# Patient Record
Sex: FEMALE | Race: WHITE | NOT HISPANIC OR LATINO | Employment: STUDENT | URBAN - METROPOLITAN AREA
[De-identification: names, ages, dates, MRNs, and addresses within clinical notes are randomized per-mention and may not be internally consistent; named-entity substitution may affect disease eponyms.]

---

## 2017-11-02 LAB
ABO + RH BLD: NORMAL
C TRACH RRNA SPEC QL PROBE: NEGATIVE
CYSTIC FIBROSIS MUTATION PNL: NEGATIVE
HEMOGLOBIN BANDS: NORMAL
HIV 1+2 AB+HIV1 P24 AG SERPL QL IA: NEGATIVE
INDIRECT COOMBS: NORMAL
N GONORRHOEAE, AMPLIFIED DNA: NEGATIVE
RUBELLA IMMUNE STATUS: NORMAL

## 2017-11-07 PROBLEM — O23.41 GBS (GROUP B STREPTOCOCCUS) UTI COMPLICATING PREGNANCY, FIRST TRIMESTER: Status: ACTIVE | Noted: 2017-11-07

## 2017-11-07 PROBLEM — B95.1 GBS (GROUP B STREPTOCOCCUS) UTI COMPLICATING PREGNANCY, FIRST TRIMESTER: Status: ACTIVE | Noted: 2017-11-07

## 2017-11-13 ENCOUNTER — TELEPHONE (OUTPATIENT)
Dept: OBSTETRICS AND GYNECOLOGY | Facility: CLINIC | Age: 22
End: 2017-11-13

## 2017-11-13 NOTE — TELEPHONE ENCOUNTER
----- Message from Britney Pena sent at 11/13/2017  1:52 PM CST -----  Contact: pt    Who Called: pt    Date of Positive Preg Test:n/a    1st day of Last Menstrual Cycle:08/22    List Any Difficulties: no    What Number to Call Back:[phone]    Pt should expect a return call by the end of the day.

## 2017-11-14 ENCOUNTER — TELEPHONE (OUTPATIENT)
Dept: OBSTETRICS AND GYNECOLOGY | Facility: CLINIC | Age: 22
End: 2017-11-14

## 2017-11-14 NOTE — TELEPHONE ENCOUNTER
----- Message from Angela Jose sent at 11/14/2017  4:36 PM CST -----  Contact: RON BRINK [[id number]]  _  1st Request  _  2nd Request  _  3rd Request        Who: RON BRINK [[id number]]    Why: Requesting a call back in regards to getting an appointment scheduled her pregnancy was confirmed with another provider. Please call her.     What Number to Call Back: [phone]    When to Expect a call back: (Within 24 hours)    Please return the call at earliest convenience. Thanks!

## 2017-12-06 ENCOUNTER — INITIAL PRENATAL (OUTPATIENT)
Dept: OBSTETRICS AND GYNECOLOGY | Facility: CLINIC | Age: 22
End: 2017-12-06
Payer: MEDICAID

## 2017-12-06 VITALS
BODY MASS INDEX: 23.72 KG/M2 | WEIGHT: 140.63 LBS | DIASTOLIC BLOOD PRESSURE: 60 MMHG | SYSTOLIC BLOOD PRESSURE: 104 MMHG

## 2017-12-06 DIAGNOSIS — Z34.02 ENCOUNTER FOR SUPERVISION OF NORMAL FIRST PREGNANCY IN SECOND TRIMESTER: ICD-10-CM

## 2017-12-06 PROBLEM — Z34.00 PREGNANCY, SUPERVISION, NORMAL, FIRST: Status: ACTIVE | Noted: 2017-12-06

## 2017-12-06 PROCEDURE — 99999 PR PBB SHADOW E&M-EST. PATIENT-LVL III: CPT | Mod: PBBFAC,,, | Performed by: OBSTETRICS & GYNECOLOGY

## 2017-12-06 PROCEDURE — 99202 OFFICE O/P NEW SF 15 MIN: CPT | Mod: TH,S$PBB,, | Performed by: OBSTETRICS & GYNECOLOGY

## 2017-12-06 PROCEDURE — 99213 OFFICE O/P EST LOW 20 MIN: CPT | Mod: PBBFAC,PO | Performed by: OBSTETRICS & GYNECOLOGY

## 2017-12-06 NOTE — PROGRESS NOTES
Complaints today: denies complaints    /60   Wt 63.8 kg (140 lb 10.5 oz)   LMP 2017 (Approximate)   BMI 23.72 kg/m²     22 y.o., at 13w4d by Estimated Date of Delivery: 18  Patient Active Problem List   Diagnosis    GBS (group b Streptococcus) UTI complicating pregnancy, first trimester    Pregnancy, supervision, normal, first     OB History    Para Term  AB Living   1             SAB TAB Ectopic Multiple Live Births                  # Outcome Date GA Lbr Zac/2nd Weight Sex Delivery Anes PTL Lv   1 Current                   Dating reviewed    Allergies and problem list reviewed and updated    Medical and surgical history reviewed    Prenatal labs reviewed and updated    Physical Exam:  ABD: soft, gravid, nontender,     Assessment:  Amanda was seen today for routine prenatal visit, morning sickness, vaginal discharge, vaginal itching and swelling.    Diagnoses and all orders for this visit:    Encounter for supervision of normal first pregnancy in second trimester  -     US MFM Procedure ([facility]); Future         Plan:      follow up 4 Weeks, kick counts, labor precautions

## 2017-12-17 ENCOUNTER — PATIENT MESSAGE (OUTPATIENT)
Dept: OBSTETRICS AND GYNECOLOGY | Facility: CLINIC | Age: 22
End: 2017-12-17

## 2017-12-23 ENCOUNTER — PATIENT MESSAGE (OUTPATIENT)
Dept: OBSTETRICS AND GYNECOLOGY | Facility: CLINIC | Age: 22
End: 2017-12-23

## 2017-12-26 ENCOUNTER — TELEPHONE (OUTPATIENT)
Dept: OBSTETRICS AND GYNECOLOGY | Facility: CLINIC | Age: 22
End: 2017-12-26

## 2017-12-26 NOTE — TELEPHONE ENCOUNTER
Called and phone number disconnected. Was attempting to call pt back about her symptoms. Will contact through Hubs1 message.

## 2018-01-03 ENCOUNTER — ROUTINE PRENATAL (OUTPATIENT)
Dept: OBSTETRICS AND GYNECOLOGY | Facility: CLINIC | Age: 23
End: 2018-01-03
Payer: MEDICAID

## 2018-01-03 VITALS
WEIGHT: 144.63 LBS | DIASTOLIC BLOOD PRESSURE: 60 MMHG | BODY MASS INDEX: 24.39 KG/M2 | SYSTOLIC BLOOD PRESSURE: 100 MMHG

## 2018-01-03 DIAGNOSIS — R10.2 PELVIC PAIN AFFECTING PREGNANCY IN SECOND TRIMESTER, ANTEPARTUM: ICD-10-CM

## 2018-01-03 DIAGNOSIS — O26.892 PELVIC PAIN AFFECTING PREGNANCY IN SECOND TRIMESTER, ANTEPARTUM: ICD-10-CM

## 2018-01-03 DIAGNOSIS — O23.41 GBS (GROUP B STREPTOCOCCUS) UTI COMPLICATING PREGNANCY, FIRST TRIMESTER: ICD-10-CM

## 2018-01-03 DIAGNOSIS — B95.1 GBS (GROUP B STREPTOCOCCUS) UTI COMPLICATING PREGNANCY, FIRST TRIMESTER: ICD-10-CM

## 2018-01-03 DIAGNOSIS — N89.8 VAGINAL DISCHARGE: ICD-10-CM

## 2018-01-03 DIAGNOSIS — B37.9 YEAST INFECTION: ICD-10-CM

## 2018-01-03 DIAGNOSIS — Z34.02 ENCOUNTER FOR SUPERVISION OF NORMAL FIRST PREGNANCY IN SECOND TRIMESTER: Primary | ICD-10-CM

## 2018-01-03 LAB
CANDIDA RRNA VAG QL PROBE: NEGATIVE
G VAGINALIS RRNA GENITAL QL PROBE: NEGATIVE
T VAGINALIS RRNA GENITAL QL PROBE: NEGATIVE

## 2018-01-03 PROCEDURE — 87491 CHLMYD TRACH DNA AMP PROBE: CPT

## 2018-01-03 PROCEDURE — 99999 PR PBB SHADOW E&M-EST. PATIENT-LVL III: CPT | Mod: PBBFAC,,, | Performed by: OBSTETRICS & GYNECOLOGY

## 2018-01-03 PROCEDURE — 99213 OFFICE O/P EST LOW 20 MIN: CPT | Mod: PBBFAC,PO | Performed by: OBSTETRICS & GYNECOLOGY

## 2018-01-03 PROCEDURE — 87480 CANDIDA DNA DIR PROBE: CPT

## 2018-01-03 PROCEDURE — 87086 URINE CULTURE/COLONY COUNT: CPT

## 2018-01-03 PROCEDURE — 99213 OFFICE O/P EST LOW 20 MIN: CPT | Mod: TH,S$PBB,, | Performed by: OBSTETRICS & GYNECOLOGY

## 2018-01-03 RX ORDER — FLUCONAZOLE 150 MG/1
150 TABLET ORAL ONCE
Qty: 1 TABLET | Refills: 1 | Status: SHIPPED | OUTPATIENT
Start: 2018-01-03 | End: 2018-01-03

## 2018-01-03 NOTE — PROGRESS NOTES
"Chief Complaint   Patient presents with    Routine Prenatal Visit       22 y.o., at 17w4d by Estimated Date of Delivery: 18    Complaints today: Reports painful, irritation, and itching in the vagina with increased "cottage cheese" vaginal discharge. She also reports right sided tightness/pain in the pelvis, worsened with sports activity. Also reports dizziness and feeling of almost passing out. Has not lost consciousness. No h/o cardiac issues, only started with second trimester. Advised increased water intake, compression stockings. Will do cardiac w.o if it does not resolve. Also c/o low back pain - lifestyle modification given.    ROS  OBSTETRICS: No contractions, bleeding, or loss of fluid.  +fetal movement.  GASTRO: No nausea or vomiting.  EXTREMITIES: No edema      OB History    Para Term  AB Living   1             SAB TAB Ectopic Multiple Live Births                  # Outcome Date GA Lbr Zac/2nd Weight Sex Delivery Anes PTL Lv   1 Current                   Dating reviewed  Allergies and problem list reviewed and updated  Medical and surgical history reviewed  Prenatal labs reviewed and updated    PHYSICAL EXAM  /60   Wt 65.6 kg (144 lb 10 oz)   LMP 2017 (Approximate)   BMI 24.39 kg/m²     GENERAL: No acute distress  NEURO: Alert and oriented x3  PSYCH: Normal mood and affect  PULMONARY: Non-labored respiration  ABDomen: Soft, gravid, nontender  PELVIC: erythema, thick yellow-white "cottage cheese" type discharge, mixed with watery-thin discharge, generalized tenderness on exam, cervix closed    ASSESSMENT AND PLAN     Pregnancy Problems (from 17 to present)     Problem Noted Resolved    Pregnancy, supervision, normal, first 2017 by Radha Saleh MD No    GBS (group b Streptococcus) UTI complicating pregnancy, first trimester 2017 by Irina Amaro MD No          Will test urine culture  Will treat yeast, +hyphae on wet prep, fern " negative     labor precautions given  Follow-up: 4 weeks

## 2018-01-04 LAB
C TRACH DNA SPEC QL NAA+PROBE: NOT DETECTED
N GONORRHOEA DNA SPEC QL NAA+PROBE: NOT DETECTED

## 2018-01-05 LAB
BACTERIA UR CULT: NORMAL
BACTERIA UR CULT: NORMAL

## 2018-01-10 ENCOUNTER — TELEPHONE (OUTPATIENT)
Dept: OBSTETRICS AND GYNECOLOGY | Facility: CLINIC | Age: 23
End: 2018-01-10

## 2018-01-10 RX ORDER — HYDROCORTISONE 25 MG/G
CREAM TOPICAL 2 TIMES DAILY
Qty: 3.5 G | Refills: 3 | Status: SHIPPED | OUTPATIENT
Start: 2018-01-10 | End: 2018-03-08

## 2018-01-10 NOTE — TELEPHONE ENCOUNTER
----- Message from Walter Hendricks MA sent at 1/10/2018 11:07 AM CST -----  Contact: patient      ----- Message -----  From: Adin Mack  Sent: 1/10/2018  10:19 AM  To: Delfino PATEL Staff    x_ 1st Request   _ 2nd Request   _ 3rd Request     Who: RON BRINK [[id number]]    Why: patient called stated she was told at appointment that a cream would be prescribed for her and sent to [facility] [address] [place] [address] [place] AT [facility] [phone].  Please call the patient in reference to the prescription.    What Number to Call Back: [phone]    When to Expect a call back: (Before the end of the day)   -- if call after 3:00 call back will be tomorrow.

## 2018-01-11 ENCOUNTER — ROUTINE PRENATAL (OUTPATIENT)
Dept: OBSTETRICS AND GYNECOLOGY | Facility: CLINIC | Age: 23
End: 2018-01-11
Payer: MEDICAID

## 2018-01-11 VITALS — WEIGHT: 145 LBS | BODY MASS INDEX: 24.45 KG/M2 | SYSTOLIC BLOOD PRESSURE: 102 MMHG | DIASTOLIC BLOOD PRESSURE: 68 MMHG

## 2018-01-11 DIAGNOSIS — O99.891 ZIKA VIRUS EXPOSURE AFFECTING PREGNANCY: ICD-10-CM

## 2018-01-11 DIAGNOSIS — Z34.02 ENCOUNTER FOR SUPERVISION OF NORMAL FIRST PREGNANCY IN SECOND TRIMESTER: Primary | ICD-10-CM

## 2018-01-11 DIAGNOSIS — Z20.821 ZIKA VIRUS EXPOSURE AFFECTING PREGNANCY: ICD-10-CM

## 2018-01-11 PROCEDURE — 99213 OFFICE O/P EST LOW 20 MIN: CPT | Mod: TH,S$PBB,, | Performed by: OBSTETRICS & GYNECOLOGY

## 2018-01-11 PROCEDURE — 99999 PR PBB SHADOW E&M-EST. PATIENT-LVL I: CPT | Mod: PBBFAC,,, | Performed by: OBSTETRICS & GYNECOLOGY

## 2018-01-11 PROCEDURE — 99211 OFF/OP EST MAY X REQ PHY/QHP: CPT | Mod: PBBFAC,PO | Performed by: OBSTETRICS & GYNECOLOGY

## 2018-01-11 NOTE — PROGRESS NOTES
Complaints today: none  Good fm.  Denies ctx, vb, lof.  Centering in pregnancy session 1.  Discussed personal goals, prenatal testing, nutrition, healthy choices.  She was in [place] 3 weeks agio    /68   Wt 65.8 kg (145 lb)   LMP 2017 (Approximate)   BMI 24.45 kg/m²     22 y.o., at 18w5d by Estimated Date of Delivery: 18  Patient Active Problem List   Diagnosis    GBS (group b Streptococcus) UTI complicating pregnancy, first trimester    Pregnancy, supervision, normal, first    Zika virus exposure affecting pregnancy     OB History    Para Term  AB Living   1             SAB TAB Ectopic Multiple Live Births                  # Outcome Date GA Lbr Zac/2nd Weight Sex Delivery Anes PTL Lv   1 Current                   Dating reviewed    Allergies and problem list reviewed and updated    Medical and surgical history reviewed    Prenatal labs reviewed and updated    Physical Exam:  ABD: soft, gravid, nontender,     Assessment:  Diagnoses and all orders for this visit:    Encounter for supervision of normal first pregnancy in second trimester    Zika virus exposure affecting pregnancy  -     Zika Virus IgM; Future         Plan:   follow up labs   follow up 4 Weeks, bleeding/pain  kick counts, labor precautions

## 2018-01-16 ENCOUNTER — PATIENT MESSAGE (OUTPATIENT)
Dept: OBSTETRICS AND GYNECOLOGY | Facility: CLINIC | Age: 23
End: 2018-01-16

## 2018-01-16 ENCOUNTER — OFFICE VISIT (OUTPATIENT)
Dept: MATERNAL FETAL MEDICINE | Facility: CLINIC | Age: 23
End: 2018-01-16
Attending: OBSTETRICS & GYNECOLOGY
Payer: MEDICAID

## 2018-01-16 DIAGNOSIS — Z36.89 ENCOUNTER FOR FETAL ANATOMIC SURVEY: ICD-10-CM

## 2018-01-16 DIAGNOSIS — Z34.02 ENCOUNTER FOR SUPERVISION OF NORMAL FIRST PREGNANCY IN SECOND TRIMESTER: ICD-10-CM

## 2018-01-16 PROCEDURE — 76805 OB US >/= 14 WKS SNGL FETUS: CPT | Mod: PBBFAC | Performed by: OBSTETRICS & GYNECOLOGY

## 2018-01-16 PROCEDURE — 76805 OB US >/= 14 WKS SNGL FETUS: CPT | Mod: 26,S$PBB,, | Performed by: OBSTETRICS & GYNECOLOGY

## 2018-01-16 PROCEDURE — 99499 UNLISTED E&M SERVICE: CPT | Mod: S$PBB,,, | Performed by: OBSTETRICS & GYNECOLOGY

## 2018-01-16 NOTE — PROGRESS NOTES
"OB Ultrasound Report (see PDF version under imaging tab)    Indication  ========    Fetal anatomy survey.    Method  ======    Transabdominal ultrasound examination.    Pregnancy  =========    Benedr pregnancy. Number of fetuses: 1.    Dating  ======    Cycle: regular cycle  GA by "stated dating" 19 w + 3 d  SHIRA by "stated dating": 6/9/2018  Ultrasound examination on: 1/16/2018  GA by U/S based upon: AC, BPD, Femur, HC  GA by U/S 19 w + 5 d  SHIRA by U/S: 6/7/2018  Assigned: Dating performed on 01/16/2018, based on the external assessment  Assigned GA 19 w + 3 d  Assigned SHIRA: 6/9/2018    General Evaluation  ===============    Cardiac activity: present.  bpm.  Fetal movements: visualized.  Presentation: breech.  Placenta: anterior.  Umbilical cord: normal, 3 vessel cord.  Amniotic fluid: normal amount.    Fetal Biometry  ===========    Fetal Biometry  BPD 45.0 mm 19w 4d Hadlock  OFD 61.9 mm 21w 2d Kayleen  .5 mm 19w 5d Hadlock  .6 mm 20w 0d Hadlock  Femur 30.4 mm 19w 3d Hadlock  Cerebellum tr 20.1 mm 20w 0d Burris  CM 4.6 mm  Nuchal fold 4.29 mm  Humerus 30.8 mm 20w 1d Kayleen   g  Calculated by: Hadlock (BPD-HC-AC-FL)  EFW (lb) 0 lb  EFW (oz) 11 oz  Cephalic index 0.73  HC / AC 1.17  FL / BPD 0.68  FL / AC 0.21   bpm  Head / Face / Neck   4.5 mm    Fetal Anatomy  ===========    Cranium: normal  Lateral ventricles: normal  Choroid plexus: normal  Midline falx: normal  Cavum septi pellucidi: normal  Cerebellum: normal  Cisterna magna: normal  Rt lateral ventricle: normal  Lt lateral ventricle: normal  Rt choroid plexus: normal  Lt choroid plexus: normal  Lips: normal  Profile: normal  Nose: normal  4-chamber view: normal  RVOT: normal  LVOT: normal  Situs: normal  Cardiac position: normal  Cardiac axis: normal  Cardiac size: normal  Cardiac rhythm: normal  Rt lung: normal  Lt lung: normal  Diaphragm: normal  Cord " insertion: normal  Stomach: normal  Kidneys: normal  Bladder: normal  Abdom. wall: appears normal  Abdom. cavity: normal  Rt kidney: normal  Lt kidney: normal  Cervical spine: normal  Thoracic spine: normal  Lumbar spine: normal  Sacral spine: normal  Arms: normal  Legs: normal  Rt arm: normal  Lt arm: normal  Rt hand: present  Lt hand: present  Rt leg: normal  Lt leg: normal  Rt foot: normal  Lt foot: normal  Gender: male  Wants to know gender: yes    Maternal Structures  ===============    Uterus / Cervix  Uterus: Normal  Cervical length 47.7 mm  Ovaries / Tubes / Adnexa  Rt ovary: Visualized  Lt ovary: Visualized    Impression  =========    A aquino living IUP is identified. Fetal size is appropriate for established dating. No fetal structural malformations are identified.  Cervical length is normal, and placental location is normal without evidence of previa. Follow-up ultrasound as clinically indicated.

## 2018-01-26 ENCOUNTER — PATIENT MESSAGE (OUTPATIENT)
Dept: OBSTETRICS AND GYNECOLOGY | Facility: CLINIC | Age: 23
End: 2018-01-26

## 2018-02-14 ENCOUNTER — ROUTINE PRENATAL (OUTPATIENT)
Dept: OBSTETRICS AND GYNECOLOGY | Facility: CLINIC | Age: 23
End: 2018-02-14
Payer: MEDICAID

## 2018-02-14 VITALS
BODY MASS INDEX: 25.76 KG/M2 | DIASTOLIC BLOOD PRESSURE: 70 MMHG | WEIGHT: 152.75 LBS | SYSTOLIC BLOOD PRESSURE: 108 MMHG

## 2018-02-14 DIAGNOSIS — Z34.03 ENCOUNTER FOR SUPERVISION OF NORMAL FIRST PREGNANCY IN THIRD TRIMESTER: Primary | ICD-10-CM

## 2018-02-14 PROCEDURE — 99212 OFFICE O/P EST SF 10 MIN: CPT | Mod: PBBFAC,TH,PO | Performed by: OBSTETRICS & GYNECOLOGY

## 2018-02-14 PROCEDURE — 99213 OFFICE O/P EST LOW 20 MIN: CPT | Mod: TH,S$PBB,, | Performed by: OBSTETRICS & GYNECOLOGY

## 2018-02-14 PROCEDURE — 99999 PR PBB SHADOW E&M-EST. PATIENT-LVL II: CPT | Mod: PBBFAC,,, | Performed by: OBSTETRICS & GYNECOLOGY

## 2018-02-14 PROCEDURE — 3008F BODY MASS INDEX DOCD: CPT | Mod: ,,, | Performed by: OBSTETRICS & GYNECOLOGY

## 2018-02-14 NOTE — PROGRESS NOTES
Chief Complaint   Patient presents with    Routine Prenatal Visit       22 y.o., at 23w4d by Estimated Date of Delivery: 18    Complaints today: none. Pt has a lot of questions about vaccines for her and baby. She also would like a tub birth and is interested in transferring her care to midwives if possible.    ROS  OBSTETRICS:   Contractions none   Bleeding none   Loss of fluid none   Fetal mvmnt yes  GASTRO:   Nausea none   Vomiting none      OB History    Para Term  AB Living   1             SAB TAB Ectopic Multiple Live Births                  # Outcome Date GA Lbr Zac/2nd Weight Sex Delivery Anes PTL Lv   1 Current                   Dating reviewed  Allergies and problem list reviewed and updated  Medical and surgical history reviewed  Prenatal labs reviewed and updated    PHYSICAL EXAM  /70   Wt 69.3 kg (152 lb 12.5 oz)   LMP 2017 (Approximate)   BMI 25.76 kg/m²     GENERAL: No acute distress  NEURO: Alert and oriented x3  PSYCH: Normal mood and affect  PULMONARY: Non-labored respiration  ABDomen: Soft, gravid, nontender    ASSESSMENT AND PLAN     Pregnancy Problems (from 17 to present)     Problem Noted Resolved    Pregnancy, supervision, normal, first 2017 by Radha Saleh MD No    GBS (group b Streptococcus) UTI complicating pregnancy, first trimester 2017 by Irina Amaro MD No          Counseled on recommended vaccines, flu (now) and Tdap (future) - pt declines flu at this time. Discussed why vaccines are recommended.  Pt desires to have a natural tub birth and her mother is midwife in [place]. Discussed that can labor in tub but if obstetrician is birth attendant, will not allow tub birth. However, patient can transfer care to [facility] for the possibility of tub birth in [place].  Needs 1hr GCT/CBC at next visit     labor precautions given  Follow-up: 4 weeks with CNM group

## 2018-03-08 ENCOUNTER — INITIAL PRENATAL (OUTPATIENT)
Dept: OBSTETRICS AND GYNECOLOGY | Facility: CLINIC | Age: 23
End: 2018-03-08
Payer: MEDICAID

## 2018-03-08 VITALS
BODY MASS INDEX: 26.34 KG/M2 | SYSTOLIC BLOOD PRESSURE: 112 MMHG | WEIGHT: 156.19 LBS | DIASTOLIC BLOOD PRESSURE: 76 MMHG

## 2018-03-08 DIAGNOSIS — Z34.92 PRENATAL CARE IN SECOND TRIMESTER: Primary | ICD-10-CM

## 2018-03-08 PROCEDURE — 99212 OFFICE O/P EST SF 10 MIN: CPT | Mod: PBBFAC | Performed by: ADVANCED PRACTICE MIDWIFE

## 2018-03-08 PROCEDURE — 99212 OFFICE O/P EST SF 10 MIN: CPT | Mod: TH,S$PBB,, | Performed by: ADVANCED PRACTICE MIDWIFE

## 2018-03-08 PROCEDURE — 99999 PR PBB SHADOW E&M-EST. PATIENT-LVL II: CPT | Mod: PBBFAC,,, | Performed by: ADVANCED PRACTICE MIDWIFE

## 2018-03-08 RX ORDER — LANOLIN ALCOHOL/MO/W.PET/CERES
400 CREAM (GRAM) TOPICAL DAILY
COMMUNITY
End: 2018-07-26

## 2018-03-09 NOTE — PROGRESS NOTES
Amanda Cazares is a 22 y.o. , presents today for a transfer of prenatal care within the [facility]    C/C: transfer of prenatal care    Dating via 9wk US    Reports normal + FM, denies VB, LOF or cramping.     SOCIAL HISTORY: Denies emotional/mental/physical/sexual violence or abuse. Feels safe at home. Unaccompanied today. In relationship with SLAVA, Tee, first baby for both. 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 They live in [place] .    PAP HISTORY: last pap , result negative, denies any history of abnormal pap smear or STDs.     Reports no long-term chronic medical conditions     Review of patient's allergies indicates:  No Known Allergies  No past medical history on file.  Past Surgical History:   Procedure Laterality Date    NOSE SURGERY       Past Surgical History:   Procedure Laterality Date    NOSE SURGERY       OB History    Para Term  AB Living   1             SAB TAB Ectopic Multiple Live Births                  # Outcome Date GA Lbr Zac/2nd Weight Sex Delivery Anes PTL Lv   1 Current                 OB History      Para Term  AB Living    1              SAB TAB Ectopic Multiple Live Births                     Social History     Social History    Marital status: Single     Spouse name: N/A    Number of children: N/A    Years of education: N/A     Occupational History    Not on file.     Social History Main Topics    Smoking status: Never Smoker    Smokeless tobacco: Never Used    Alcohol use No    Drug use: No    Sexual activity: Yes     Partners: Male     Birth control/ protection: None     Other Topics Concern    Not on file     Social History Narrative    Partner Tee    First baby for both!    Live in [place]    She is [employer], he has palma shop     Family History   Problem Relation Age of Onset    Breast cancer Maternal Grandmother      Dx later in life    Miscarriages / Stillbirths Maternal Grandmother     Colon cancer Neg Hx      labor  Neg Hx     Ovarian cancer Neg Hx      History   Sexual Activity    Sexual activity: Yes    Partners: Male    Birth control/ protection: None     OBJECTIVE: see prenatal flow sheet  /76   Wt 70.8 kg (156 lb 3.1 oz)   LMP 2017 (Approximate)   BMI 26.34 kg/m²   Constitutional/Gen: NAD, appears stated age, well groomed  Lung: normal resp effort  Extremities: FROM, with no edema or tenderness.  Neurologic: A&O x 4, non-focal, cranial nerves 2-12 grossly intact  Psych: affect appropriate and without signs of mood, thought or memory difficulty appreciated    ASSESSMENT:  22 y.o. female  at 26w5d for transfer of prenatal care  Body mass index is 26.34 kg/m².  Patient Active Problem List   Diagnosis    GBS (group b Streptococcus) UTI complicating pregnancy, first trimester    Pregnancy, supervision, normal, first    Zika virus exposure affecting pregnancy       PLAN:  1. Transfer of prenatal care within [facility]  -- Continue prenatal care  -- Upcoming 28wk labs reviewed and ordered. Rh POS.     2. BMI 22  -- Discussed IOM recommended weight gain of:   Weight category Pre pre BMI  Recommended TWG   Underweight Less than 18.5 28-40    Normal Weight 18.5-24.9  25-35    Overweight 25-29.9  15-25    Obese   30 and greater  11-20   -- Discussed criteria for delivery at [facility] r/t excessive pre-preg weight or excessive weight gain:   Pre-pregnancy BMI over 40 or excess pregnancy weight gain defined as:   Pre-preg BMI < 18.5; Excess weight gain = > 60 pound   Pre-preg BMI 18.5-24.9;  Excess weight gain = > 53 pounds   Pre-preg BMI 25-29.9;  Excess weight gain = > 38 pounds   Pre-preg BMI > 30;  Excess weight gain = > 30 pounds    3. Oriented to practice and midwifery service  -- Pregnancy education and couseling; handouts and booklet provided  -- Reviewed anticipated prenatal course of care and how to contact us  -- Reviewed [facility] guidelines and admission, exclusion, and transfer criteria  --  Precautions/warning signs reviewed  -- Common complaints of pregnancy  -- Routine prenatal labs including HIV  -- Ultrasounds  -- Childbirth education/hospital/ABC facilities  -- Nutrition, prepregnant BMI, and recommended weight gain  -- Toxoplasmosis precautions (Cats/Raw Meat)  -- Sexual activity and exercise  -- Environmental/Work hazards  -- Travel  -- Tobacco (Ask, Advise, Assess, Assist, and Arrange), as well as alcohol and drug use  -- Use of any medications (Including supplements, Vitamins, Herbs, or OTC Drugs)  -- Domestic violence screen negative    4. GBS uria  -- Reviewed, will need intrapartum abx    5. Possible zika exposure  -- Travelled to [place]; labs were already ordered; she plans to obtain    6. Reviewed warning signs, precautions, and how/when to contact us.     7. RTC x 2 weeks, call or present sooner prn.     Updated Medication List:  Current Outpatient Prescriptions   Medication Sig Dispense Refill    magnesium oxide (MAG-OX) 400 mg tablet Take 400 mg by mouth once daily.      omega-3 fatty acids fish oil 1,050-1,200 mg Cap capsule Take 1 capsule by mouth once daily.      PRENATAL VIT,CALC76/IRON/FOLIC (PNV 29-1 ORAL) Take 1 tablet by mouth once daily.       No current facility-administered medications for this visit.          Allison Orellana CNM/NP  3/8/2018 8:50 PM

## 2018-03-20 ENCOUNTER — PATIENT MESSAGE (OUTPATIENT)
Dept: OBSTETRICS AND GYNECOLOGY | Facility: CLINIC | Age: 23
End: 2018-03-20

## 2018-03-21 NOTE — TELEPHONE ENCOUNTER
Called pt and discussed - describes small area just beside/above umbilicus that is painful at times. Occurs intermittently and seems to occur at least once daily. Denies VB, no LOF. Reports good FM.  Denies upper abdominal pain/pain just below right breast. No headache (although they have occured intermittently in her pregnancy) and no visual disturbances.  Discussed possibility of umbilical hernia, but unable to diagnose over the phone.  Education re: warning s/s of PTL and PreE to come in with.  Discussed if hernia, warning signs such as skin changes, worsening/increasing or severe pain, etc which would warrant urgent medical evaluation.    In addition, pt reports suspects she stepped wrong this morning and having some groin discomfort with certain walking movements now this evening. Advised Tylenol, ice, and/or warm bath and rest. Discussed warning s/s to come in for evaluation re:. Gave after hours contact info.

## 2018-03-22 ENCOUNTER — HOSPITAL ENCOUNTER (EMERGENCY)
Facility: OTHER | Age: 23
Discharge: HOME | End: 2018-03-22
Attending: OBSTETRICS & GYNECOLOGY
Payer: MEDICAID

## 2018-03-22 VITALS
RESPIRATION RATE: 18 BRPM | DIASTOLIC BLOOD PRESSURE: 77 MMHG | OXYGEN SATURATION: 98 % | TEMPERATURE: 98 F | HEART RATE: 73 BPM | SYSTOLIC BLOOD PRESSURE: 117 MMHG

## 2018-03-22 DIAGNOSIS — B37.31 YEAST VAGINITIS: ICD-10-CM

## 2018-03-22 DIAGNOSIS — R10.2 PELVIC PAIN AFFECTING PREGNANCY IN THIRD TRIMESTER, ANTEPARTUM: Primary | ICD-10-CM

## 2018-03-22 DIAGNOSIS — Z3A.28 28 WEEKS GESTATION OF PREGNANCY: ICD-10-CM

## 2018-03-22 DIAGNOSIS — O26.893 PELVIC PAIN AFFECTING PREGNANCY IN THIRD TRIMESTER, ANTEPARTUM: Primary | ICD-10-CM

## 2018-03-22 LAB
BACTERIA #/AREA URNS HPF: NORMAL /HPF
BILIRUB UR QL STRIP: NEGATIVE
CLARITY UR: CLEAR
COLOR UR: YELLOW
GLUCOSE UR QL STRIP: NEGATIVE
HGB UR QL STRIP: NEGATIVE
KETONES UR QL STRIP: NEGATIVE
LEUKOCYTE ESTERASE UR QL STRIP: ABNORMAL
MICROSCOPIC COMMENT: NORMAL
NITRITE UR QL STRIP: NEGATIVE
PH UR STRIP: 7 [PH] (ref 5–8)
PROT UR QL STRIP: NEGATIVE
RBC #/AREA URNS HPF: 1 /HPF (ref 0–4)
SP GR UR STRIP: 1.01 (ref 1–1.03)
SQUAMOUS #/AREA URNS HPF: 3 /HPF
URN SPEC COLLECT METH UR: ABNORMAL
UROBILINOGEN UR STRIP-ACNC: NEGATIVE EU/DL
WBC #/AREA URNS HPF: 5 /HPF (ref 0–5)

## 2018-03-22 PROCEDURE — 59025 FETAL NON-STRESS TEST: CPT | Mod: 26,,, | Performed by: OBSTETRICS & GYNECOLOGY

## 2018-03-22 PROCEDURE — 81000 URINALYSIS NONAUTO W/SCOPE: CPT

## 2018-03-22 PROCEDURE — 25000003 PHARM REV CODE 250: Performed by: STUDENT IN AN ORGANIZED HEALTH CARE EDUCATION/TRAINING PROGRAM

## 2018-03-22 PROCEDURE — 99284 EMERGENCY DEPT VISIT MOD MDM: CPT | Mod: 25,,, | Performed by: OBSTETRICS & GYNECOLOGY

## 2018-03-22 PROCEDURE — 59025 FETAL NON-STRESS TEST: CPT

## 2018-03-22 PROCEDURE — 99284 EMERGENCY DEPT VISIT MOD MDM: CPT | Mod: 25

## 2018-03-22 RX ORDER — ACETAMINOPHEN 325 MG/1
650 TABLET ORAL ONCE
Status: COMPLETED | OUTPATIENT
Start: 2018-03-22 | End: 2018-03-22

## 2018-03-22 RX ORDER — TERCONAZOLE 8 MG/G
1 CREAM VAGINAL NIGHTLY
Qty: 20 G | Refills: 0 | Status: SHIPPED | OUTPATIENT
Start: 2018-03-22 | End: 2018-03-25

## 2018-03-22 RX ADMIN — ACETAMINOPHEN 650 MG: 325 TABLET ORAL at 02:03

## 2018-03-22 NOTE — ED NOTES
Patient arrived in [place] with complaint of pelvic pain starting this morning.    Escorted to [place] bed   .    States + fetal movement.    Denies vaginal bleeding or rupture of membranes.  External fetal monitors applied for NST.

## 2018-03-22 NOTE — DISCHARGE INSTRUCTIONS
Call L&D @ [phone] if 5-6 contractions every hour x 2 hours, decreased fetal movement, vaginal bleeding (soaking a maxipad in an hour), or if your water bag breaks.

## 2018-03-22 NOTE — ED PROVIDER NOTES
Encounter Date: 3/22/2018       History     Chief Complaint   Patient presents with    Pelvic Pain       Ms Amanda Cazares is a 22yoF  at 28w5d who presents today with pelvic pain, located near the pubic symphisis. The pain is intermittent and sharp and tearing in nature, and is accompanied by some nausea. Denies emesis, fever, diarrhea. Pain began this morning, when she kicked her right leg up while getting out of bed. She reports difficulty walking and has been on bedrest since this AM. The pain is not relieved by ice pack. She has not tried any oral pain medication.     Pt denies contractions and abdominal pain. She denies dysuria and difficulty with bowel movements. There is no blood or vaginal discharge. Pt denies LOF. Pt reports normal fetal movements.     This IUP is uncomplicated.          Review of patient's allergies indicates:  No Known Allergies  History reviewed. No pertinent past medical history.  Past Surgical History:   Procedure Laterality Date    NOSE SURGERY       Family History   Problem Relation Age of Onset    Breast cancer Maternal Grandmother      Dx later in life    Miscarriages / Stillbirths Maternal Grandmother     Colon cancer Neg Hx      labor Neg Hx     Ovarian cancer Neg Hx      Social History   Substance Use Topics    Smoking status: Never Smoker    Smokeless tobacco: Never Used    Alcohol use No     Review of Systems   Constitutional: Negative.    HENT: Negative.    Eyes: Negative.  Negative for photophobia and visual disturbance.   Respiratory: Shortness of breath: attributes to pregnancy. Not associated with pelvic pain.    Cardiovascular: Negative.    Gastrointestinal: Positive for abdominal distention (gravid). Negative for abdominal pain, constipation, diarrhea, nausea and vomiting.   Endocrine: Negative.    Genitourinary: Positive for pelvic pain (located near and slightly left of pubic symphisis) and vaginal discharge. Negative for decreased urine volume,  difficulty urinating, dysuria, frequency, urgency, vaginal bleeding and vaginal pain.   Musculoskeletal: Negative.  Negative for back pain.   Skin: Negative.  Negative for rash.   Neurological: Negative.  Negative for headaches.   Hematological: Negative.    Psychiatric/Behavioral: Negative.        Physical Exam     Initial Vitals [03/22/18 0125]   BP Pulse Resp Temp SpO2   117/77 73 18 98.4 °F (36.9 °C) 98 %      MAP       90.33         Physical Exam    Vitals reviewed.  Constitutional: She appears well-nourished.   HENT:   Head: Normocephalic and atraumatic.   Cardiovascular: Normal rate, regular rhythm and normal heart sounds.   Pulmonary/Chest: Breath sounds normal. No respiratory distress.   Abdominal: Soft. She exhibits distension (gravid). There is no tenderness. There is no rebound and no guarding.   Genitourinary: Vaginal discharge (cottage cheese-like discharge) found.   Genitourinary Comments: Cheese like discharge. Cervix visually closed, no cervicitis or erythema.     Wet prep: pseudohyphae seen   Musculoskeletal: She exhibits no edema or tenderness.   Neurological: She is alert and oriented to person, place, and time.   Skin: Skin is warm and dry.   Psychiatric: She has a normal mood and affect.     OB LABOR EXAM:   Pre-Term Labor: No.   Membranes ruptured: No.   Method: Sterile speculum exam per MD and Sterile vaginal exam per MD.   Vaginal Bleeding: none present.     Dilatation: 0.                 ED Course   Obtain Fetal nonstress test (NST)  Date/Time: 3/22/2018 2:27 PM  Performed by: RIKA CRESPO  Authorized by: WALDEMAR LARES     Nonstress Test:     Variability:  6-25 BPM    Decelerations:  None    Accelerations:  10 bpm    Baseline:  135    Contractions:  Not present  Biophysical Profile:     Nonstress Test Interpretation: reactive      Overall Impression:  Reassuring      Labs Reviewed   URINALYSIS             Medical Decision Making:   Differential Diagnosis:   Round ligament  "pain vs yeast infection  ED Management:  - Tylenol 650   - NST - reactive, reassuring  - Wet prep - positive for pseudohyphae  - UA pending  - Patient's pain likely due to vaginal irritation due to yeast infection. No s/sx of pre term labor. Will discharge with clotrimazole vaginal applicator  - Counseled labor precautions:  Excessive vaginal bleeding beyond spotting over next 48 hours  Frequent, painful contractions  Fluid loss ("gush of fluid")  Decreased fetal movement                Attending Attestation:   Physician Attestation Statement for Resident:  As the supervising MD   Physician Attestation Statement: I have personally seen and examined this patient.   I agree with the above history. -:   As the supervising MD I agree with the above PE.    As the supervising MD I agree with the above treatment, course, plan, and disposition.   -: Patient evaluated and found to be stable, agree with resident's assessment and plan.  I was personally present during the critical portions of the procedure(s) performed by the resident and was immediately available in the ED to provide services and assistance as needed during the entire procedure.  I have reviewed and agree with the residents interpretation of the following: lab data.  I have reviewed the following: old records at this facility.                    ED Course as of Mar 22 0249   Thu Mar 22, 2018   0232 22 year old  at 28 weeks, CNM patient presents with pelvic pain since this am. No vaginal bleeding or leakage of fluid. NST reactive, no contractions. Cervix closed, UA negative. + yeast on exam.   [AR]      ED Course User Index  [AR] Basia Tucker MD     Clinical Impression:   The primary encounter diagnosis was Pelvic pain affecting pregnancy in third trimester, antepartum. Diagnoses of 28 weeks gestation of pregnancy and Yeast vaginitis were also pertinent to this visit.    Disposition:   Disposition: Discharged  Condition: Stable                      "   Sunil Quinn MD  Resident  03/22/18 0254       Basia Tucker MD  03/22/18 8577

## 2018-03-27 DIAGNOSIS — Z34.93 PRENATAL CARE IN THIRD TRIMESTER: Primary | ICD-10-CM

## 2018-04-03 ENCOUNTER — ROUTINE PRENATAL (OUTPATIENT)
Dept: OBSTETRICS AND GYNECOLOGY | Facility: CLINIC | Age: 23
End: 2018-04-03
Payer: MEDICAID

## 2018-04-03 ENCOUNTER — HOSPITAL ENCOUNTER (OUTPATIENT)
Dept: PERINATAL CARE | Facility: OTHER | Age: 23
Discharge: HOME | End: 2018-04-03
Payer: MEDICAID

## 2018-04-03 VITALS
BODY MASS INDEX: 27.36 KG/M2 | DIASTOLIC BLOOD PRESSURE: 72 MMHG | WEIGHT: 162.25 LBS | SYSTOLIC BLOOD PRESSURE: 108 MMHG

## 2018-04-03 DIAGNOSIS — O36.8131 DECREASED FETAL MOVEMENTS IN THIRD TRIMESTER, FETUS 1 OF MULTIPLE GESTATION: Primary | ICD-10-CM

## 2018-04-03 DIAGNOSIS — O36.8131 DECREASED FETAL MOVEMENTS IN THIRD TRIMESTER, FETUS 1 OF MULTIPLE GESTATION: ICD-10-CM

## 2018-04-03 PROCEDURE — 99212 OFFICE O/P EST SF 10 MIN: CPT | Mod: PBBFAC,25,TH | Performed by: ADVANCED PRACTICE MIDWIFE

## 2018-04-03 PROCEDURE — 99999 PR PBB SHADOW E&M-EST. PATIENT-LVL II: CPT | Mod: PBBFAC,,, | Performed by: ADVANCED PRACTICE MIDWIFE

## 2018-04-03 PROCEDURE — 59025 FETAL NON-STRESS TEST: CPT

## 2018-04-03 PROCEDURE — 99212 OFFICE O/P EST SF 10 MIN: CPT | Mod: TH,S$PBB,25, | Performed by: ADVANCED PRACTICE MIDWIFE

## 2018-04-03 PROCEDURE — 59025 FETAL NON-STRESS TEST: CPT | Mod: 26,,, | Performed by: OBSTETRICS & GYNECOLOGY

## 2018-04-03 NOTE — MEDICAL/APP STUDENT
S:   Reports + FM, denies VB, LOF, or CTX/cramping  Unaccompanied today  Doing well with concerns about decreased fetal movement. FHT obtained at this visit. Discussed quality vs quantity of movements and how movements change over time as baby grows. Amanda feels like quantity of movements have decreased. PNT ordered for today after she gets her blood drawn for labs. Stressed importance of contacting clinic via after hours number at any time if she feels baby's movements are decreased. Amanda verbalizes understanding.     O:  Blood pressure: 108/72   Today's weight: 73.6  TW # for pre preg BMI of 22.8  Fundal height: 31  FHT: 145    A/P:  22 y.o. female  at 30w3d by 9w5d US  -- Reviewed prenatal labs and ultrasounds. Orders today: PNT.  -- Discussed Tdap vaccine today. Amanda would like to do some research and let us know at the next visit if she plans to get the vaccine.   -- Reviewed warning signs, normal FM/FKCs,  labor precautions and how/when to call.  -- RTC x 2 wks, call or present sooner prn.     GBS +  -- Discussed GBS and implications for fetus. Discussed recommendation of antibiotics at start and periodically through labor to decrease risk to fetus. Amanda verbalizes understanding of the implications and process.    29 Weight gain  -- Reviewed recommendations, healthy dietary choices, and criteria for ABC    Possible Zika exposure  -- Previously denied Zika testing, but would like to get it done. Showed Amanda that order is still in computer and she can have this lab drawn when she gets her other labs drawn today.     Birth Center Risk Assessment: 0- Meets birth center guidelines    0- CNM management in ABC  1- CNM management on L&D  2- Consultation with OB to develop  plan of care  3- Collaborative CNM/OB management with delivery on L&D  4- Referral of care to MD edwards  5- Permanent referral of care to MD Kyra Neal, RN, SNM  4/3/2018  12:10 PM

## 2018-04-03 NOTE — PROGRESS NOTES
Please see documentation by student CNJORGE Neal I was present for evaluation and agree with documentation    To PNT for NST d/t decreased FM  FKCs reviewed     Allison Orellana CNM/NP  Certified Nurse Midwife/Nurse Practitioner  4/3/2018 12:19 PM

## 2018-04-04 ENCOUNTER — TELEPHONE (OUTPATIENT)
Dept: PEDIATRICS | Facility: CLINIC | Age: 23
End: 2018-04-04

## 2018-04-04 NOTE — TELEPHONE ENCOUNTER
----- Message from Arlet Natarajan sent at 4/3/2018 12:33 PM CDT -----  Contact: pt  [phone]   Amanda called to schedule an apt for a perspective parent visit, her due date is on 6-. She is going to deliver at [facility].

## 2018-04-05 ENCOUNTER — HOSPITAL ENCOUNTER (EMERGENCY)
Facility: OTHER | Age: 23
Discharge: HOME | End: 2018-04-05
Attending: OBSTETRICS & GYNECOLOGY
Payer: MEDICAID

## 2018-04-05 ENCOUNTER — TELEPHONE (OUTPATIENT)
Dept: OBSTETRICS AND GYNECOLOGY | Facility: OTHER | Age: 23
End: 2018-04-05

## 2018-04-05 VITALS
OXYGEN SATURATION: 99 % | DIASTOLIC BLOOD PRESSURE: 59 MMHG | RESPIRATION RATE: 20 BRPM | SYSTOLIC BLOOD PRESSURE: 105 MMHG | HEART RATE: 80 BPM | TEMPERATURE: 97 F

## 2018-04-05 DIAGNOSIS — Z3A.30 30 WEEKS GESTATION OF PREGNANCY: Primary | ICD-10-CM
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
## 2018-04-05 PROCEDURE — 99283 EMERGENCY DEPT VISIT LOW MDM: CPT | Mod: 25,,, | Performed by: OBSTETRICS & GYNECOLOGY

## 2018-04-05 PROCEDURE — 59025 FETAL NON-STRESS TEST: CPT

## 2018-04-05 PROCEDURE — 59025 FETAL NON-STRESS TEST: CPT | Mod: 26,,, | Performed by: OBSTETRICS & GYNECOLOGY

## 2018-04-05 PROCEDURE — 99283 EMERGENCY DEPT VISIT LOW MDM: CPT | Mod: 25

## 2018-04-05 NOTE — TELEPHONE ENCOUNTER
returned call to pt.  Pt state she fell over a bar stool and fell on her belly.  Pt  denies contractions, leaking fluids and vaginal bleeding, she reports + fm   Instructed pt to report to the OB ED for evaluation

## 2018-04-05 NOTE — ED PROVIDER NOTES
"Encounter Date: 2018       History     Chief Complaint   Patient presents with    Fall     Amanda Cazares is a 22 y.o. F at 30w5d presents complaining of "falling on a barstool". Patient fell on a barstool and hit the top of her abdomen at roughly midnight last night. States she is not having any abdominal pain. Denies any contractions. Denies any vaginal bleeding. Confirms good fetal movment. Denies any leakage of fluid. This IUP is uncomplicated.            Review of patient's allergies indicates:  No Known Allergies  No past medical history on file.  Past Surgical History:   Procedure Laterality Date    NOSE SURGERY       Family History   Problem Relation Age of Onset    Breast cancer Maternal Grandmother      Dx later in life    Miscarriages / Stillbirths Maternal Grandmother     Colon cancer Neg Hx      labor Neg Hx     Ovarian cancer Neg Hx      Social History   Substance Use Topics    Smoking status: Never Smoker    Smokeless tobacco: Never Used    Alcohol use No     Review of Systems   Constitutional: Negative for chills, fatigue and fever.   HENT: Negative for congestion.    Eyes: Negative for visual disturbance.   Respiratory: Negative for cough and shortness of breath.    Cardiovascular: Negative for chest pain and palpitations.   Gastrointestinal: Negative for abdominal distention, abdominal pain, constipation, diarrhea, nausea and vomiting.   Genitourinary: Negative for difficulty urinating, dysuria, hematuria, vaginal bleeding and vaginal discharge.   Skin: Negative for rash.   Neurological: Negative for dizziness, seizures, light-headedness and headaches.   Hematological: Does not bruise/bleed easily.   Psychiatric/Behavioral: Negative for dysphoric mood. The patient is not nervous/anxious.        Physical Exam     Initial Vitals   BP Pulse Resp Temp SpO2   -- -- -- -- --      MAP       --       BP (!) 105/59   Pulse 80   Temp 96.8 °F (36 °C)   Resp 20   LMP 2017 " (Approximate)   SpO2 99%       Physical Exam    Vitals reviewed.  Constitutional: She appears well-developed and well-nourished.   In NAD   Cardiovascular: Normal rate and regular rhythm.   Pulmonary/Chest: No respiratory distress.   Abdominal: Soft. She exhibits no distension. There is no tenderness. There is no rebound and no guarding.   Gravid     Genitourinary:   Genitourinary Comments: Gravid, NT uterus   Musculoskeletal: She exhibits no edema.   Neurological: She is alert and oriented to person, place, and time.   Skin: Skin is warm and dry.   Psychiatric: She has a normal mood and affect. Her behavior is normal. Judgment and thought content normal.         ED Course   Obtain Fetal nonstress test (NST)  Date/Time: 4/5/2018 4:35 PM  Performed by: EDA FOREMAN  Authorized by: EDA FOREMAN     Nonstress Test:     Variability:  6-25 BPM    Decelerations:  None    Accelerations:  10 bpm    Acoustic Stimulator: No      Baseline:  150    Uterine Irritability: No      Contractions:  Not present  Biophysical Profile:     Nonstress Test Interpretation: reactive      Overall Impression:  Reassuring        Labs Reviewed - No data to display          Medical Decision Making:   ED Management:  - no ctx, no vaginal bleeding  - NST reactive and reassuring  - Rh positive  - anterior placenta reviewed on imaging  - monitoring for 1 hour showed no contractions and continued NST reactive and reassuring  - ED and labor precautions discussed               Attending Attestation:   Physician Attestation Statement for Resident:  As the supervising MD   Physician Attestation Statement: I have personally seen and examined this patient.   I agree with the above history. -:   As the supervising MD I agree with the above PE.    As the supervising MD I agree with the above treatment, course, plan, and disposition.   -: Patient evaluated and found to be stable, agree with resident's assessment and plan.  I was personally present  during the critical portions of the procedure(s) performed by the resident and was immediately available in the ED to provide services and assistance as needed during the entire procedure.                    ED Course as of 1741   Thu 2018   1633  at 30 5/7 weeks s/p fall yesterday at noon. No contractions or vaginal bleeding, good fm. Rh+.  reactive, no contractions or decels noted  [AR]   1732 NST continues to be reassuring and reactive.   [AR]      ED Course User Index  [AR] Basia Tucker MD     Clinical Impression:   The primary encounter diagnosis was 30 weeks gestation of pregnancy. A diagnosis of Fall at home, initial encounter was also pertinent to this visit.    Disposition:   Disposition: Discharged  Condition: Stable                        Marlene Carrillo MD  Resident  [phone]       Basia Tucker MD  [phone]

## 2018-04-05 NOTE — TELEPHONE ENCOUNTER
----- Message from Alejandrina Kalpesh sent at 4/5/2018  2:10 PM CDT -----  Contact: RON BRINK [[id number]]  _  1st Request  _  2nd Request  _  3rd Request        Who: RON BRINK [[id number]]    Why: patient 30 wks states on yesterday she fell over a bar stool and fell on her belly. Patient states she does not have any pain or vaginal bleeding. Patient still has fetal movement. Patient would like to know if she should still come in and be seen or not. Please advise.     What Number to Call Back: [phone]    When to Expect a call back: (Before the end of the day)   -- if call after 3:00 call back will be tomorrow.

## 2018-04-05 NOTE — ED NOTES
Pt presents to [facility] to be assessed for stumbling upon a bar stool at 0000 last night. Pt placed in assessment 2 and dr Carrillo notified

## 2018-04-17 ENCOUNTER — OFFICE VISIT (OUTPATIENT)
Dept: PEDIATRICS | Facility: CLINIC | Age: 23
End: 2018-04-17
Payer: MEDICAID

## 2018-04-17 ENCOUNTER — ROUTINE PRENATAL (OUTPATIENT)
Dept: OBSTETRICS AND GYNECOLOGY | Facility: CLINIC | Age: 23
End: 2018-04-17
Payer: MEDICAID

## 2018-04-17 VITALS
SYSTOLIC BLOOD PRESSURE: 110 MMHG | WEIGHT: 163.13 LBS | DIASTOLIC BLOOD PRESSURE: 68 MMHG | BODY MASS INDEX: 27.51 KG/M2

## 2018-04-17 DIAGNOSIS — Z34.03 ENCOUNTER FOR SUPERVISION OF NORMAL FIRST PREGNANCY IN THIRD TRIMESTER: Primary | ICD-10-CM

## 2018-04-17 PROCEDURE — 99212 OFFICE O/P EST SF 10 MIN: CPT | Mod: PBBFAC,TH | Performed by: ADVANCED PRACTICE MIDWIFE

## 2018-04-17 PROCEDURE — 99499 UNLISTED E&M SERVICE: CPT | Mod: S$PBB,,, | Performed by: PEDIATRICS

## 2018-04-17 PROCEDURE — 99999 PR PBB SHADOW E&M-EST. PATIENT-LVL II: CPT | Mod: PBBFAC,,, | Performed by: ADVANCED PRACTICE MIDWIFE

## 2018-04-17 PROCEDURE — 99212 OFFICE O/P EST SF 10 MIN: CPT | Mod: TH,S$PBB,, | Performed by: ADVANCED PRACTICE MIDWIFE

## 2018-04-17 NOTE — PROGRESS NOTES
22 y.o. female  at 32w3d by 9wk US  Reports + FM, denies VB, LOF or CTX  Doing well without concerns today  TW lbs for pre preg BMI of 22  Reviewed 28wk lab results (O POS)  Still considering Tdap - she will let us know  GBS uria. Planning abx in labor.   Reviewed warning signs, normal FKCs,  labor precautions and how/when to call.  RTC x 2 wks, call or present sooner prn.     Birth Center Risk Assessment: 0  0- CNM management in ABC  1- CNM management on L&D  2- Consultation with OB to develop plan of care  3- Collaborative CNM/OB management with delivery on L&D  4- Referral or transfer of care to MD

## 2018-05-01 ENCOUNTER — ROUTINE PRENATAL (OUTPATIENT)
Dept: OBSTETRICS AND GYNECOLOGY | Facility: CLINIC | Age: 23
End: 2018-05-01
Payer: MEDICAID

## 2018-05-01 VITALS
BODY MASS INDEX: 27.73 KG/M2 | WEIGHT: 164.44 LBS | SYSTOLIC BLOOD PRESSURE: 114 MMHG | DIASTOLIC BLOOD PRESSURE: 66 MMHG

## 2018-05-01 DIAGNOSIS — Z34.93 PRENATAL CARE IN THIRD TRIMESTER: ICD-10-CM

## 2018-05-01 DIAGNOSIS — R30.0 DYSURIA: Primary | ICD-10-CM

## 2018-05-01 DIAGNOSIS — B37.31 VAGINAL YEAST INFECTION: ICD-10-CM

## 2018-05-01 DIAGNOSIS — N89.8 VAGINAL DISCHARGE: ICD-10-CM

## 2018-05-01 LAB
CANDIDA RRNA VAG QL PROBE: POSITIVE
G VAGINALIS RRNA GENITAL QL PROBE: POSITIVE
T VAGINALIS RRNA GENITAL QL PROBE: NEGATIVE

## 2018-05-01 PROCEDURE — 99212 OFFICE O/P EST SF 10 MIN: CPT | Mod: TH,S$PBB,, | Performed by: ADVANCED PRACTICE MIDWIFE

## 2018-05-01 PROCEDURE — 87510 GARDNER VAG DNA DIR PROBE: CPT

## 2018-05-01 PROCEDURE — 87480 CANDIDA DNA DIR PROBE: CPT

## 2018-05-01 PROCEDURE — 87086 URINE CULTURE/COLONY COUNT: CPT

## 2018-05-01 PROCEDURE — 99999 PR PBB SHADOW E&M-EST. PATIENT-LVL III: CPT | Mod: PBBFAC,,, | Performed by: ADVANCED PRACTICE MIDWIFE

## 2018-05-01 PROCEDURE — 99213 OFFICE O/P EST LOW 20 MIN: CPT | Mod: PBBFAC | Performed by: ADVANCED PRACTICE MIDWIFE

## 2018-05-01 RX ORDER — MICONAZOLE NITRATE 2 %
1 CREAM WITH APPLICATOR VAGINAL NIGHTLY
Qty: 45 G | Refills: 0 | Status: SHIPPED | OUTPATIENT
Start: 2018-05-01 | End: 2018-05-08

## 2018-05-01 NOTE — PROGRESS NOTES
22 y.o. female  at 34w3d   Reports + FM, denies VB, LOF or CTX  Reports vaginal itching and slight burning with urination  Wants labioplasty after delivery; I'll try to find providers and let her know  SSE: + white clumpy discharge; rx provided, affirm and urine culture sent  TW lbs   GBS uria. Planning abx in labor.   Desires Tdap, ordered  Reviewed warning signs, normal FKCs,  labor precautions and how/when to call.  RTC x 2 wks, call or present sooner prn.     Birth Center Risk Assessment: 0  0- CNM management in ABC  1- CNM management on L&D  2- Consultation with OB to develop plan of care  3- Collaborative CNM/OB management with delivery on L&D  4- Referral or transfer of care to MD

## 2018-05-02 ENCOUNTER — PATIENT MESSAGE (OUTPATIENT)
Dept: OBSTETRICS AND GYNECOLOGY | Facility: CLINIC | Age: 23
End: 2018-05-02

## 2018-05-02 LAB — BACTERIA UR CULT: NORMAL

## 2018-05-16 ENCOUNTER — CLINICAL SUPPORT (OUTPATIENT)
Dept: OBSTETRICS AND GYNECOLOGY | Facility: CLINIC | Age: 23
End: 2018-05-16
Payer: MEDICAID

## 2018-05-16 ENCOUNTER — ROUTINE PRENATAL (OUTPATIENT)
Dept: OBSTETRICS AND GYNECOLOGY | Facility: CLINIC | Age: 23
End: 2018-05-16
Payer: MEDICAID

## 2018-05-16 ENCOUNTER — LAB VISIT (OUTPATIENT)
Dept: LAB | Facility: OTHER | Age: 23
End: 2018-05-16
Payer: MEDICAID

## 2018-05-16 VITALS — BODY MASS INDEX: 27.9 KG/M2 | WEIGHT: 165.44 LBS | SYSTOLIC BLOOD PRESSURE: 102 MMHG | DIASTOLIC BLOOD PRESSURE: 66 MMHG

## 2018-05-16 DIAGNOSIS — Z34.93 PRENATAL CARE IN THIRD TRIMESTER: Primary | ICD-10-CM

## 2018-05-16 DIAGNOSIS — Z34.93 PRENATAL CARE IN THIRD TRIMESTER: ICD-10-CM

## 2018-05-16 PROCEDURE — 86703 HIV-1/HIV-2 1 RESULT ANTBDY: CPT

## 2018-05-16 PROCEDURE — 99212 OFFICE O/P EST SF 10 MIN: CPT | Mod: PBBFAC,27,25 | Performed by: ADVANCED PRACTICE MIDWIFE

## 2018-05-16 PROCEDURE — 99999 PR PBB SHADOW E&M-EST. PATIENT-LVL II: CPT | Mod: PBBFAC,,, | Performed by: ADVANCED PRACTICE MIDWIFE

## 2018-05-16 PROCEDURE — 99211 OFF/OP EST MAY X REQ PHY/QHP: CPT | Mod: PBBFAC,25

## 2018-05-16 PROCEDURE — 36415 COLL VENOUS BLD VENIPUNCTURE: CPT

## 2018-05-16 PROCEDURE — 99212 OFFICE O/P EST SF 10 MIN: CPT | Mod: TH,S$PBB,, | Performed by: ADVANCED PRACTICE MIDWIFE

## 2018-05-16 PROCEDURE — 90471 IMMUNIZATION ADMIN: CPT | Mod: PBBFAC

## 2018-05-16 PROCEDURE — 86592 SYPHILIS TEST NON-TREP QUAL: CPT

## 2018-05-16 PROCEDURE — 99999 PR PBB SHADOW E&M-EST. PATIENT-LVL I: CPT | Mod: PBBFAC,,,

## 2018-05-16 NOTE — PROGRESS NOTES
22 y.o. female  at 36w4d   Unaccompanied today  Reports + FM, denies VB, LOF or regular CTX  Doing well w  TW lbs for pre preg BMI of 22  GBS POS (uria)  Reviewed [place] department of [facility] recommendation for repeat HIV/RPR today; she will obtain   Reviewed warning signs, normal FKCs, labor precautions and how/when to call.  RTC x 1 wks, call or present sooner prn.   Birth Center Risk Assessment: 0  0- CNM management in ABC  1- CNM management on L&D  2- Consultation with OB to develop plan of care  3- Collaborative CNM/OB management with delivery on L&D  4- Referral or transfer of care to MD

## 2018-05-17 LAB
HIV 1+2 AB+HIV1 P24 AG SERPL QL IA: NEGATIVE
RPR SER QL: NORMAL

## 2018-05-24 ENCOUNTER — ROUTINE PRENATAL (OUTPATIENT)
Dept: OBSTETRICS AND GYNECOLOGY | Facility: CLINIC | Age: 23
End: 2018-05-24
Payer: MEDICAID

## 2018-05-24 VITALS
SYSTOLIC BLOOD PRESSURE: 100 MMHG | WEIGHT: 166.44 LBS | BODY MASS INDEX: 28.07 KG/M2 | DIASTOLIC BLOOD PRESSURE: 60 MMHG

## 2018-05-24 DIAGNOSIS — Z34.93 PRENATAL CARE IN THIRD TRIMESTER: Primary | ICD-10-CM

## 2018-05-24 PROCEDURE — 99212 OFFICE O/P EST SF 10 MIN: CPT | Mod: TH,S$PBB,, | Performed by: ADVANCED PRACTICE MIDWIFE

## 2018-05-24 PROCEDURE — 99999 PR PBB SHADOW E&M-EST. PATIENT-LVL II: CPT | Mod: PBBFAC,,, | Performed by: ADVANCED PRACTICE MIDWIFE

## 2018-05-24 PROCEDURE — 99212 OFFICE O/P EST SF 10 MIN: CPT | Mod: PBBFAC | Performed by: ADVANCED PRACTICE MIDWIFE

## 2018-05-24 NOTE — PROGRESS NOTES
22 y.o. female  at 37w5d  With her mom today who is here from [place] and will be here until after the baby is born!  Reports + FM, denies VB, LOF or regular CTX  TWG 33#  Reviewed GBS POS and need for intrapartum abx  Reviewed repeat HIV/RPR neg/neg  Reviewed warning signs, normal FKCs, labor precautions and how/when to call.  RTC x 1 wks, call or present sooner prn.   Birth Center Risk Assessment: 0  0- CNM management in ABC  1- CNM management on L&D  2- Consultation with OB to develop plan of care  3- Collaborative CNM/OB management with delivery on L&D  4- Referral or transfer of care to MD

## 2018-05-29 ENCOUNTER — ROUTINE PRENATAL (OUTPATIENT)
Dept: OBSTETRICS AND GYNECOLOGY | Facility: CLINIC | Age: 23
End: 2018-05-29
Payer: MEDICAID

## 2018-05-29 VITALS
WEIGHT: 166.69 LBS | DIASTOLIC BLOOD PRESSURE: 66 MMHG | SYSTOLIC BLOOD PRESSURE: 102 MMHG | BODY MASS INDEX: 28.11 KG/M2

## 2018-05-29 DIAGNOSIS — Z34.03 ENCOUNTER FOR SUPERVISION OF NORMAL FIRST PREGNANCY IN THIRD TRIMESTER: Primary | ICD-10-CM

## 2018-05-29 PROCEDURE — 99213 OFFICE O/P EST LOW 20 MIN: CPT | Mod: S$PBB,TH,, | Performed by: ADVANCED PRACTICE MIDWIFE

## 2018-05-29 PROCEDURE — 99999 PR PBB SHADOW E&M-EST. PATIENT-LVL II: CPT | Mod: PBBFAC,,, | Performed by: ADVANCED PRACTICE MIDWIFE

## 2018-05-29 PROCEDURE — 99212 OFFICE O/P EST SF 10 MIN: CPT | Mod: PBBFAC,TH | Performed by: ADVANCED PRACTICE MIDWIFE

## 2018-05-29 NOTE — PROGRESS NOTES
Doing well today  Denies VB LOF but has daily occasional ctx.  Reviewed POS GBS and antibiotic treatment  Reviewed S/S labor and when to call

## 2018-06-07 ENCOUNTER — ROUTINE PRENATAL (OUTPATIENT)
Dept: OBSTETRICS AND GYNECOLOGY | Facility: CLINIC | Age: 23
End: 2018-06-07
Payer: MEDICAID

## 2018-06-07 VITALS
SYSTOLIC BLOOD PRESSURE: 108 MMHG | DIASTOLIC BLOOD PRESSURE: 62 MMHG | WEIGHT: 167.44 LBS | BODY MASS INDEX: 28.24 KG/M2

## 2018-06-07 DIAGNOSIS — N76.0 ACUTE VAGINITIS: Primary | ICD-10-CM

## 2018-06-07 PROCEDURE — 99999 PR PBB SHADOW E&M-EST. PATIENT-LVL II: CPT | Mod: PBBFAC,,, | Performed by: ADVANCED PRACTICE MIDWIFE

## 2018-06-07 PROCEDURE — 87510 GARDNER VAG DNA DIR PROBE: CPT

## 2018-06-07 PROCEDURE — 99212 OFFICE O/P EST SF 10 MIN: CPT | Mod: TH,S$PBB,, | Performed by: ADVANCED PRACTICE MIDWIFE

## 2018-06-07 PROCEDURE — 87480 CANDIDA DNA DIR PROBE: CPT

## 2018-06-07 PROCEDURE — 99212 OFFICE O/P EST SF 10 MIN: CPT | Mod: PBBFAC | Performed by: ADVANCED PRACTICE MIDWIFE

## 2018-06-07 RX ORDER — CLOTRIMAZOLE 1 %
CREAM (GRAM) TOPICAL
Qty: 30 G | Refills: 0 | Status: ON HOLD | OUTPATIENT
Start: 2018-06-07 | End: 2018-06-16 | Stop reason: HOSPADM

## 2018-06-09 ENCOUNTER — TELEPHONE (OUTPATIENT)
Dept: OBSTETRICS AND GYNECOLOGY | Facility: HOSPITAL | Age: 23
End: 2018-06-09

## 2018-06-09 DIAGNOSIS — B96.89 BACTERIAL VAGINOSIS: Primary | ICD-10-CM

## 2018-06-09 DIAGNOSIS — N76.0 BACTERIAL VAGINOSIS: Primary | ICD-10-CM

## 2018-06-09 RX ORDER — METRONIDAZOLE 500 MG/1
500 TABLET ORAL EVERY 12 HOURS
Qty: 14 TABLET | Refills: 0 | Status: ON HOLD | OUTPATIENT
Start: 2018-06-09 | End: 2018-06-16 | Stop reason: HOSPADM

## 2018-06-09 NOTE — PROGRESS NOTES
22 y.o. female  at 39w5d by LMP c/w 9 US here today with mother - midwife in [place]  Reports + FM, denies VB, LOF or regular CTX  Doing well but complains of white discharge and irritation to external vulva - exam done with redness to vulva and copious white discharge noted. Affirm sent and treating empirically for candida.   TW lbs   (O POS)  GBSuria - pt understands we will treat in labor.   Reviewed warning signs, normal FKCs, and how/when to call.  RTC x 1 wk, call or present sooner prn.   Birth Center Risk Assessment: 0- Meets birth center guidelines    0- CNM management in ABC  1- CNM management on L&D  2- Consultation with OB to develop  plan of care  3- Collaborative CNM/OB management with delivery on L&D  4- Referral of care to MD temporarily  5- Permanent referral of care to MD

## 2018-06-09 NOTE — TELEPHONE ENCOUNTER
Called pt with lab results. She has already started treatment for yeast as prescribed. She desires BV treatment - sent to pharmacy.

## 2018-06-14 ENCOUNTER — ROUTINE PRENATAL (OUTPATIENT)
Dept: OBSTETRICS AND GYNECOLOGY | Facility: CLINIC | Age: 23
End: 2018-06-14
Payer: MEDICAID

## 2018-06-14 ENCOUNTER — TELEPHONE (OUTPATIENT)
Dept: OBSTETRICS AND GYNECOLOGY | Facility: HOSPITAL | Age: 23
End: 2018-06-14

## 2018-06-14 ENCOUNTER — HOSPITAL ENCOUNTER (INPATIENT)
Facility: OTHER | Age: 23
LOS: 2 days | Discharge: HOME | End: 2018-06-16
Attending: OBSTETRICS & GYNECOLOGY | Admitting: OBSTETRICS & GYNECOLOGY
Payer: MEDICAID

## 2018-06-14 DIAGNOSIS — Z34.03 ENCOUNTER FOR SUPERVISION OF NORMAL FIRST PREGNANCY IN THIRD TRIMESTER: Primary | ICD-10-CM

## 2018-06-14 DIAGNOSIS — Z37.9 NORMAL LABOR: ICD-10-CM

## 2018-06-14 LAB
ABO + RH BLD: NORMAL
BASOPHILS # BLD AUTO: 0.01 K/UL
BASOPHILS NFR BLD: 0.1 %
BLD GP AB SCN CELLS X3 SERPL QL: NORMAL
DIFFERENTIAL METHOD: ABNORMAL
EOSINOPHIL # BLD AUTO: 0 K/UL
EOSINOPHIL NFR BLD: 0 %
ERYTHROCYTE [DISTWIDTH] IN BLOOD BY AUTOMATED COUNT: 13.2 %
HCT VFR BLD AUTO: 37.1 %
HGB BLD-MCNC: 12.7 G/DL
LYMPHOCYTES # BLD AUTO: 1 K/UL
LYMPHOCYTES NFR BLD: 6.7 %
MCH RBC QN AUTO: 30.9 PG
MCHC RBC AUTO-ENTMCNC: 34.2 G/DL
MCV RBC AUTO: 90 FL
MONOCYTES # BLD AUTO: 0.5 K/UL
MONOCYTES NFR BLD: 3.7 %
NEUTROPHILS # BLD AUTO: 13 K/UL
NEUTROPHILS NFR BLD: 89.3 %
PLATELET # BLD AUTO: 135 K/UL
PMV BLD AUTO: 12.4 FL
RBC # BLD AUTO: 4.11 M/UL
WBC # BLD AUTO: 14.5 K/UL

## 2018-06-14 PROCEDURE — 99999 PR PBB SHADOW E&M-EST. PATIENT-LVL I: CPT | Mod: PBBFAC,,, | Performed by: ADVANCED PRACTICE MIDWIFE

## 2018-06-14 PROCEDURE — 99211 OFF/OP EST MAY X REQ PHY/QHP: CPT | Mod: PBBFAC,25,TH | Performed by: ADVANCED PRACTICE MIDWIFE

## 2018-06-14 PROCEDURE — 25000003 PHARM REV CODE 250: Performed by: ADVANCED PRACTICE MIDWIFE

## 2018-06-14 PROCEDURE — 25000003 PHARM REV CODE 250

## 2018-06-14 PROCEDURE — 85025 COMPLETE CBC W/AUTO DIFF WBC: CPT

## 2018-06-14 PROCEDURE — 96372 THER/PROPH/DIAG INJ SC/IM: CPT

## 2018-06-14 PROCEDURE — 72100002 HC LABOR CARE, 1ST 8 HOURS

## 2018-06-14 PROCEDURE — 63600175 PHARM REV CODE 636 W HCPCS: Performed by: ADVANCED PRACTICE MIDWIFE

## 2018-06-14 PROCEDURE — 72200004 HC VAGINAL DELIVERY LEVEL I

## 2018-06-14 PROCEDURE — 86850 RBC ANTIBODY SCREEN: CPT

## 2018-06-14 PROCEDURE — 59409 OBSTETRICAL CARE: CPT | Mod: GB,,, | Performed by: ADVANCED PRACTICE MIDWIFE

## 2018-06-14 PROCEDURE — 11000001 HC ACUTE MED/SURG PRIVATE ROOM

## 2018-06-14 PROCEDURE — 99213 OFFICE O/P EST LOW 20 MIN: CPT | Mod: S$PBB,TH,, | Performed by: ADVANCED PRACTICE MIDWIFE

## 2018-06-14 RX ORDER — IBUPROFEN 600 MG/1
600 TABLET ORAL EVERY 6 HOURS PRN
Status: DISCONTINUED | OUTPATIENT
Start: 2018-06-14 | End: 2018-06-16 | Stop reason: HOSPADM

## 2018-06-14 RX ORDER — DOCUSATE SODIUM 100 MG/1
200 CAPSULE, LIQUID FILLED ORAL 2 TIMES DAILY PRN
Status: DISCONTINUED | OUTPATIENT
Start: 2018-06-14 | End: 2018-06-16 | Stop reason: HOSPADM

## 2018-06-14 RX ORDER — MISOPROSTOL 200 UG/1
600 TABLET ORAL
Status: DISCONTINUED | OUTPATIENT
Start: 2018-06-14 | End: 2018-06-16 | Stop reason: HOSPADM

## 2018-06-14 RX ORDER — DIPHENHYDRAMINE HCL 25 MG
25 CAPSULE ORAL EVERY 4 HOURS PRN
Status: DISCONTINUED | OUTPATIENT
Start: 2018-06-14 | End: 2018-06-16 | Stop reason: HOSPADM

## 2018-06-14 RX ORDER — HYDROCORTISONE 25 MG/G
CREAM TOPICAL 3 TIMES DAILY PRN
Status: DISCONTINUED | OUTPATIENT
Start: 2018-06-14 | End: 2018-06-16 | Stop reason: HOSPADM

## 2018-06-14 RX ORDER — DIPHENHYDRAMINE HYDROCHLORIDE 50 MG/ML
25 INJECTION INTRAMUSCULAR; INTRAVENOUS EVERY 4 HOURS PRN
Status: DISCONTINUED | OUTPATIENT
Start: 2018-06-14 | End: 2018-06-16 | Stop reason: HOSPADM

## 2018-06-14 RX ORDER — OXYTOCIN 10 [USP'U]/ML
10 INJECTION, SOLUTION INTRAMUSCULAR; INTRAVENOUS ONCE
Status: COMPLETED | OUTPATIENT
Start: 2018-06-14 | End: 2018-06-14

## 2018-06-14 RX ORDER — ACETAMINOPHEN 325 MG/1
650 TABLET ORAL EVERY 6 HOURS PRN
Status: DISCONTINUED | OUTPATIENT
Start: 2018-06-14 | End: 2018-06-16 | Stop reason: HOSPADM

## 2018-06-14 RX ORDER — ONDANSETRON 8 MG/1
8 TABLET, ORALLY DISINTEGRATING ORAL EVERY 8 HOURS PRN
Status: DISCONTINUED | OUTPATIENT
Start: 2018-06-14 | End: 2018-06-16 | Stop reason: HOSPADM

## 2018-06-14 RX ORDER — IBUPROFEN 600 MG/1
600 TABLET ORAL EVERY 6 HOURS
Status: DISCONTINUED | OUTPATIENT
Start: 2018-06-14 | End: 2018-06-14

## 2018-06-14 RX ORDER — ONDANSETRON 8 MG/1
8 TABLET, ORALLY DISINTEGRATING ORAL EVERY 8 HOURS PRN
Status: DISCONTINUED | OUTPATIENT
Start: 2018-06-14 | End: 2018-06-14

## 2018-06-14 RX ORDER — SODIUM CHLORIDE, SODIUM LACTATE, POTASSIUM CHLORIDE, CALCIUM CHLORIDE 600; 310; 30; 20 MG/100ML; MG/100ML; MG/100ML; MG/100ML
INJECTION, SOLUTION INTRAVENOUS CONTINUOUS
Status: DISCONTINUED | OUTPATIENT
Start: 2018-06-14 | End: 2018-06-16 | Stop reason: HOSPADM

## 2018-06-14 RX ADMIN — PENICILLIN G POTASSIUM 5 MILLION UNITS: 5000000 POWDER, FOR SOLUTION INTRAMUSCULAR; INTRAPLEURAL; INTRATHECAL; INTRAVENOUS at 02:06

## 2018-06-14 RX ADMIN — OXYTOCIN 10 UNITS: 10 INJECTION, SOLUTION INTRAMUSCULAR; INTRAVENOUS at 03:06

## 2018-06-14 RX ADMIN — DOCUSATE SODIUM 200 MG: 100 CAPSULE, LIQUID FILLED ORAL at 09:06

## 2018-06-14 RX ADMIN — SODIUM CHLORIDE, POTASSIUM CHLORIDE, SODIUM LACTATE AND CALCIUM CHLORIDE 1000 ML: 600; 310; 30; 20 INJECTION, SOLUTION INTRAVENOUS at 02:06

## 2018-06-14 NOTE — TELEPHONE ENCOUNTER
Ret pt call regarding srom  Says the fluid is clear  Still juliette  Says her mother checked her cervix and she is 4 cms.  Will come in for assessment soon

## 2018-06-14 NOTE — L&D DELIVERY NOTE
[facility]  Vaginal Delivery   Operative Note    SUMMARY     Spontaneous vaginal delivery of viable male infant.  Apgars 6/9 over an intact perineum.   Cord clamped x 2 and baby handed to RN's for assessment.   Placenta delivered spontaneously and intact via Mart, 3 vessel cord, at U-3. Fundus firm, uterus intact.   mL.    Pitocin 10 mu IM per pt request for prophylaxis.   Mom and baby stable, with baby skin to skin and family bonding well..  Patient tolerated delivery well. Sponge needle and lap counted correctly x2.    Indications:  (normal spontaneous vaginal delivery)  Pregnancy complicated by:   Patient Active Problem List   Diagnosis    GBS (group b Streptococcus) UTI complicating pregnancy, first trimester    Pregnancy, supervision, normal, first    Zika virus exposure affecting pregnancy    Normal labor     (normal spontaneous vaginal delivery)     Admitting GA: 40w5d    Delivery Information for  Bo Cazares    Birth information:  YOB: 2019   Time of birth: 3:02 PM   Sex: male   Head Delivery Date/Time: 2018  3:00 PM   Delivery type: Vaginal, Spontaneous Delivery   Gestational Age: 40w5d    Delivery Providers    Delivering clinician:  Samia Rizzo CNM   Provider Role    KILEY Escalante RN Catherine P. Badeaux, CNM                 [place] Assessment    Living status:  Living  Apgars:     1 Minute:   5 Minute:   10 Minute:   15 Minute:   20 Minute:     Skin Color:   1  1       Heart Rate:   2  2       Reflex Irritability:   1  2       Muscle Tone:   1  2       Respiratory Effort:   1  2       Total:   6  9               Apgars Assigned By:  ADIEL MIRANDA         Assisted Delivery Details:    Forceps attempted?:  No  Vacuum extractor attempted?:  No         Shoulder Dystocia    Shoulder dystocia present?:  No           Presentation and Position    Presentation:  Vertex  Position:  Middle Occiput Anterior            Interventions/Resuscitation    Method:  Bulb Suctioning, Tactile Stimulation       Cord    Vessels:  3 vessels  Complications:  None  Delayed Cord Clamping?:  No  Cord Blood Disposition:  Sent with Baby       Placenta    Date and time:  2018  3:08 PM  Removal:  Spontaneous  Appearance:  Intact  Placenta disposition:  discarded           Labor Events:       labor: No     Labor Onset Date/Time: 2018 06:30     Dilation Complete Date/Time: 2018 14:15     Start Pushing Date/Time: 2018 14:15     Rupture Date/Time: 18  1020         Rupture type: spontaneous rupture of membranes         Fluid Amount:        Fluid Color: clear      Fluid Odor: none      Membrane Status (PeriCalm):        Rupture Date/Time (PeriCalm):        Fluid Amount (PeriCalm):        Fluid Color (PeriCalm):         steroids: None     Antibiotics given for GBS: Yes     Induction: none     Indications for induction:        Augmentation:       Indications for augmentation:       Labor complications: None     Additional complications:          Cervical ripening:                     Delivery:      Episiotomy: None     Indication for Episiotomy:       Perineal Lacerations:   Repaired:      Periurethral Laceration:   Repaired:     Labial Laceration:   Repaired:     Sulcus Laceration:   Repaired:     Vaginal Laceration:   Repaired:     Cervical Laceration:   Repaired:     Repair suture:       Repair # of packets:       Vaginal delivery QBL (mL): 100      QBL (mL): 0     Combined Blood Loss (mL): 100     Vaginal Sweep Performed:       [facility] Correct:         Other providers:       Anesthesia    Method:  None          Details (if applicable):  Trial of Labor      Categorization:      Priority:     Indications for :     Incision Type:       Additional  information:  Forceps:    Vacuum:    Breech:    Observed anomalies    Other (Comments):         Samia Rizzo CNM

## 2018-06-14 NOTE — HOSPITAL COURSE
13:35  Admitted to ABC unit for active labor  Will start atibiotics for POS GBS and expectant management  17 , male

## 2018-06-14 NOTE — SUBJECTIVE & OBJECTIVE
Obstetric HPI:  Patient reports Date/time of onset: 6:30 am  contractions, active fetal movement, Yes vaginal bleeding (bloody show) , Yes loss of fluid     This pregnancy has been complicated by POS GBS    Obstetric History       T0      L0     SAB0   TAB0   Ectopic0   Multiple0   Live Births0       # Outcome Date GA Lbr Zac/2nd Weight Sex Delivery Anes PTL Lv   1 Current                 No past medical history on file.  Past Surgical History:   Procedure Laterality Date    NOSE SURGERY         PTA Medications   Medication Sig    clotrimazole (LOTRIMIN) 1 % cream Insert one applicator full vaginally each night for 7 nights. You may also use cream externally twice a day.    magnesium oxide (MAG-OX) 400 mg tablet Take 400 mg by mouth once daily.    metroNIDAZOLE (FLAGYL) 500 MG tablet Take 1 tablet (500 mg total) by mouth every 12 (twelve) hours.    omega-3 fatty acids fish oil 1,050-1,200 mg Cap capsule Take 1 capsule by mouth once daily.    PRENATAL VIT,CALC76/IRON/FOLIC (PNV 29-1 ORAL) Take 1 tablet by mouth once daily.       Review of patient's allergies indicates:  No Known Allergies     Family History     Problem Relation (Age of Onset)    Breast cancer Maternal Grandmother    Miscarriages / Stillbirths Maternal Grandmother        Social History Main Topics    Smoking status: Never Smoker    Smokeless tobacco: Never Used    Alcohol use No    Drug use: No    Sexual activity: Yes     Partners: Male     Birth control/ protection: None     Review of Systems   Constitutional: Negative.    Respiratory: Negative.    Cardiovascular: Negative.    Neurological: Negative.    Psychiatric/Behavioral: Negative.       Objective:     Vital Signs (Most Recent):    Vital Signs (24h Range):           There is no height or weight on file to calculate BMI.    FHT: 140's Cat 1 (reassuring)  accels auscultated   TOCO:  Q 4-5  minutes    Physical Exam:   Constitutional: She is oriented to person, place, and  time. She appears well-developed and well-nourished.    HENT:   Head: Normocephalic and atraumatic.     Neck: Neck supple.    Cardiovascular: Normal rate, regular rhythm and normal heart sounds.     Pulmonary/Chest: Effort normal and breath sounds normal.        Abdominal: Soft.   gravid     Genitourinary: Uterus normal. No vaginal discharge (clear fluid & bloody show) found.           Musculoskeletal: Normal range of motion and moves all extremeties.       Neurological: She is alert and oriented to person, place, and time.    Skin: Skin is warm and dry.    Psychiatric: She has a normal mood and affect. Her behavior is normal. Judgment and thought content normal.       Cervix:  Dilation:  6  Effacement:  90%  Station: -2  Presentation: Vertex     Significant Labs:  Lab Results   Component Value Date    GROUPTRH O POS 11/02/2017    HEPBSAG  GBS NR  POS 11/02/2017           I have personallly reviewed all pertinent lab results from the last 24 hours.

## 2018-06-14 NOTE — TELEPHONE ENCOUNTER
----- Message from Leandra Dan MA sent at 6/14/2018 12:11 PM CDT -----  Patient mother called stating her daughter's water has broken and she is 4 cm dilated. Msg given to Lanette.    Thanks  Chapito

## 2018-06-14 NOTE — H&P
[facility]  Obstetrics  History & Physical    Patient Name: Amanda Cazraes  MRN: [id number]  Admission Date: 2018  Primary Care Provider: Primary Doctor No    Subjective:     Principal Problem:Normal labor    History of Present Illness:  History of Present Illness:   Amanda Cazares is a 22 y.o. female  at 40w5d with Estimated Date of Delivery: 18 based on LMP & dating U/S who presents to [facility] accompanied by  and mother . Expecting a Boy    Reports regular uterine contractions since 6:30 am. They are now 4-5 minutes apart and increasing in intensity and duration.  moderate contractions, active fetal movement, Yes vaginal bleeding , Yes loss of fluid.     Last ate  at , last drank drinking now at     This pregnancy has been complicated by POS GBS   Patient Active Problem List   Diagnosis    GBS (group b Streptococcus) UTI complicating pregnancy, first trimester    Pregnancy, supervision, normal, first    Zika virus exposure affecting pregnancy        Presentation: vertex  Estimated Fetal Weight: 7.5 lbs    Birth Center Risk Assessment: 0- Meets birth center guidelines        Obstetric HPI:  Patient reports Date/time of onset: 6:30 am  contractions, active fetal movement, Yes vaginal bleeding (bloody show) , Yes loss of fluid     This pregnancy has been complicated by POS GBS    Obstetric History       T0      L0     SAB0   TAB0   Ectopic0   Multiple0   Live Births0       # Outcome Date GA Lbr Zac/2nd Weight Sex Delivery Anes PTL Lv   1 Current                 No past medical history on file.  Past Surgical History:   Procedure Laterality Date    NOSE SURGERY         PTA Medications   Medication Sig    clotrimazole (LOTRIMIN) 1 % cream Insert one applicator full vaginally each night for 7 nights. You may also use cream externally twice a day.    magnesium oxide (MAG-OX) 400 mg tablet Take 400 mg by mouth once daily.    metroNIDAZOLE (FLAGYL)  500 MG tablet Take 1 tablet (500 mg total) by mouth every 12 (twelve) hours.    omega-3 fatty acids fish oil 1,050-1,200 mg Cap capsule Take 1 capsule by mouth once daily.    PRENATAL VIT,CALC76/IRON/FOLIC (PNV 29-1 ORAL) Take 1 tablet by mouth once daily.       Review of patient's allergies indicates:  No Known Allergies     Family History     Problem Relation (Age of Onset)    Breast cancer Maternal Grandmother    Miscarriages / Stillbirths Maternal Grandmother        Social History Main Topics    Smoking status: Never Smoker    Smokeless tobacco: Never Used    Alcohol use No    Drug use: No    Sexual activity: Yes     Partners: Male     Birth control/ protection: None     Review of Systems   Constitutional: Negative.    Respiratory: Negative.    Cardiovascular: Negative.    Neurological: Negative.    Psychiatric/Behavioral: Negative.       Objective:     Vital Signs (Most Recent):    Vital Signs (24h Range):           There is no height or weight on file to calculate BMI.    FHT: 140's Cat 1 (reassuring)  accels auscultated   TOCO:  Q 4-5  minutes    Physical Exam:   Constitutional: She is oriented to person, place, and time. She appears well-developed and well-nourished.    HENT:   Head: Normocephalic and atraumatic.     Neck: Neck supple.    Cardiovascular: Normal rate, regular rhythm and normal heart sounds.     Pulmonary/Chest: Effort normal and breath sounds normal.        Abdominal: Soft.   gravid     Genitourinary: Uterus normal. No vaginal discharge (clear fluid & bloody show) found.           Musculoskeletal: Normal range of motion and moves all extremeties.       Neurological: She is alert and oriented to person, place, and time.    Skin: Skin is warm and dry.    Psychiatric: She has a normal mood and affect. Her behavior is normal. Judgment and thought content normal.       Cervix:  Dilation:  6  Effacement:  90%  Station: -2  Presentation: Vertex     Significant Labs:  Lab Results   Component  Value Date    GROUPTRH O POS 2017    HEPBSAG  GBS NR  POS 2017           I have personallly reviewed all pertinent lab results from the last 24 hours.    Assessment/Plan:     22 y.o. female  at 40w5d for:    No notes have been filed under this hospital service.  Service: Obstetrics and Gynecology      Melinda Leiva, AMOL  Obstetrics  [facility]

## 2018-06-14 NOTE — PROGRESS NOTES
Here for C/O ctx since 6:30  Denies LOF.  Mucous and bloody show when wiping.  States ctx are 5 min apart  VE 3/50/-2/intact  Offered to have her walk for 1 hour and recheck or go home.  She choses to go home and return later]  Encouraged to drink fluids   Call for any problems or if labor progresses

## 2018-06-14 NOTE — HPI
History of Present Illness:   Amanda Cazares is a 22 y.o. female  at 40w5d with Estimated Date of Delivery: 18 based on LMP & dating U/S who presents to [facility] accompanied by  and mother . Expecting a Boy    Reports regular uterine contractions since 6:30 am. They are now 4-5 minutes apart and increasing in intensity and duration.  moderate contractions, active fetal movement, Yes vaginal bleeding , Yes loss of fluid.     Last ate  at , last drank drinking now at     This pregnancy has been complicated by POS GBS   Patient Active Problem List   Diagnosis    GBS (group b Streptococcus) UTI complicating pregnancy, first trimester    Pregnancy, supervision, normal, first    Zika virus exposure affecting pregnancy        Presentation: vertex  Estimated Fetal Weight: 7.5 lbs    Birth Center Risk Assessment: 0- Meets birth center guidelines

## 2018-06-14 NOTE — PROGRESS NOTES
[facility]  Obstetrics  Labor Progress Note    Patient Name: Amanda Cazares  MRN: [id number]  Admission Date: 2018  Hospital Length of Stay: 0 days  Attending Physician: Geneva Joya MD  Primary Care Provider: Primary Doctor No    Subjective:     Principal Problem:Normal labor    Interval History:  Amanda is a 22 y.o.  at 40w5d. She is doing well. C/O occas urge to push with ctx    Objective:     Vital Signs (Most Recent):  Temp: 97.6 °F (36.4 °C) (18 1345)  Pulse: 73 (18 1345)  Resp: 18 (18 1345)  BP: (!) 127/58 (18 1345) Vital Signs (24h Range):  Temp:  [97.6 °F (36.4 °C)] 97.6 °F (36.4 °C)  Pulse:  [73] 73  Resp:  [18] 18  BP: (127)/(58) 127/58        There is no height or weight on file to calculate BMI.    FHT: 130's  TOCO:  Q 3-4 minutes    Physical Exam    Cervical Exam:  Dilation:  8  Effacement:  100%  Station: -2  Presentation: Vertex  Clear fluid     Significant Labs:  Lab Results   Component Value Date    GROUPTRH O POS 2017    HEPBSAG NR 2017       I have personallly reviewed all pertinent lab results from the last 24 hours.    Assessment/Plan:     22 y.o. female  at 40w5d for:    Active Diagnoses:    Diagnosis Date Noted POA    PRINCIPAL PROBLEM:  Normal labor [O80, Z37.9] 2018 Not Applicable    Normal labor [O80, Z37.9] 2018 Not Applicable      Problems Resolved During this Admission:    Diagnosis Date Noted Date Resolved POA       Antibiotics infusing  Tub prn  Continue expectant management    Melinda Leiva CNM  Obstetrics  [facility]

## 2018-06-15 PROBLEM — Z37.9 NORMAL LABOR: Status: RESOLVED | Noted: 2018-06-14 | Resolved: 2018-06-15

## 2018-06-15 PROBLEM — Z34.00 PREGNANCY, SUPERVISION, NORMAL, FIRST: Status: RESOLVED | Noted: 2017-12-06 | Resolved: 2018-06-15

## 2018-06-15 PROCEDURE — 25000003 PHARM REV CODE 250: Performed by: ADVANCED PRACTICE MIDWIFE

## 2018-06-15 PROCEDURE — 11000001 HC ACUTE MED/SURG PRIVATE ROOM

## 2018-06-15 PROCEDURE — 99232 SBSQ HOSP IP/OBS MODERATE 35: CPT | Mod: ,,, | Performed by: ADVANCED PRACTICE MIDWIFE

## 2018-06-15 RX ADMIN — IBUPROFEN 600 MG: 600 TABLET ORAL at 02:06

## 2018-06-15 RX ADMIN — DOCUSATE SODIUM 200 MG: 100 CAPSULE, LIQUID FILLED ORAL at 09:06

## 2018-06-15 RX ADMIN — IBUPROFEN 600 MG: 600 TABLET ORAL at 09:06

## 2018-06-15 RX ADMIN — IBUPROFEN 600 MG: 600 TABLET ORAL at 12:06

## 2018-06-15 NOTE — ASSESSMENT & PLAN NOTE
PPD#1  Normal involution  Breastfeeding well  Desires d/c home today, will d/c if baby discharged.  Otherwise plan d/c in am.  Discharge instructions reviewed

## 2018-06-15 NOTE — SUBJECTIVE & OBJECTIVE
Hospital course: 13:35  Admitted to ABC unit for active labor  Will start atibiotics for POS GBS and expectant management  17 , male      Interval History: PPD#1    She is doing well this morning. She is tolerating a regular diet without nausea or vomiting. She is voiding spontaneously. She is ambulating. She has passed flatus, and has not a BM. Vaginal bleeding is mild. She denies fever or chills. Abdominal pain is mild and controlled with oral medications. She is breastfeeding.     Objective:     Vital Signs (Most Recent):  Temp: 97.3 °F (36.3 °C) (06/15/18 07)  Pulse: 82 (06/15/18 0700)  Resp: 18 (06/15/18 0700)  BP: (!) 110/58 (06/15/18 0700)  SpO2: (!) 94 % (06/15/18 0700) Vital Signs (24h Range):  Temp:  [97.3 °F (36.3 °C)-99 °F (37.2 °C)] 97.3 °F (36.3 °C)  Pulse:  [] 82  Resp:  [17-18] 18  SpO2:  [94 %-96 %] 94 %  BP: (108-146)/(58-75) 110/58     Weight: 76 kg (167 lb 7 oz)  Body mass index is 28.24 kg/m².      Intake/Output Summary (Last 24 hours) at 06/15/18 0945  Last data filed at 06/15/18 0000   Gross per 24 hour   Intake              360 ml   Output             1250 ml   Net             -890 ml       Significant Labs:  Lab Results   Component Value Date    GROUPTRH O POS 2018    HEPBSAG NR 2017       Recent Labs  Lab 18  1400   HGB 12.7   HCT 37.1       CBC:   Recent Labs  Lab 18  1400   WBC 14.50*   RBC 4.11   HGB 12.7   HCT 37.1   *   MCV 90   MCH 30.9   MCHC 34.2     I have personallly reviewed all pertinent lab results from the last 24 hours.    Physical Exam:   Constitutional: She is oriented to person, place, and time. She appears well-developed and well-nourished.       Cardiovascular: Normal rate.     Pulmonary/Chest: Effort normal.        Abdominal: Soft.             Musculoskeletal: Normal range of motion.       Neurological: She is alert and oriented to person, place, and time.    Skin: Skin is warm and dry.    Psychiatric: She has a normal mood  and affect.   breasts soft and non-tender, nipples intact

## 2018-06-15 NOTE — DISCHARGE INSTRUCTIONS
Breastfeeding discharge instructions given with First Alert form and reviewed.  Also discussed:   AAP recommendation of exclusive breastfeeding for the first 6 months of life and continued breastfeeding with the introduction of supplemental foods beyond the first year of life.  Instructed on the recommendation to delay all bottle and pacifier use until after 4 weeks of age and breastfeeding is well established.  Discussed the benefits of exclusive breastfeeding for both mother and baby.  Discussed the risks of supplementation/pacifier use on the exclusivity of breastfeeding in the first 6 months. Feed the baby at the earliest sign of hunger or comfort  o Hands to mouth, sucking motions  o Rooting or searching for something to suck on  o Dont wait for crying - it is a not a late sign of hunger; it is a sign of distress     The feedings may be 8-12 times per 24hrs and will not follow a schedule   Alternate the breast you start the feeding with, or start with the breast that feels the fullest   Switch breasts when the baby takes himself off the breast or falls asleep   Keep offering breasts until the baby looks full, no longer gives hunger signs, and stays asleep when placed on his back in the crib   If the baby is sleepy and wont wake for a feeding, put the baby skin-to-skin dressed in a diaper against the mothers bare chest   Sleep near your baby   The baby should be positioned and latched on to the breast correctly  o Chest-to-chest, chin in the breast  o Babys lips are flipped outward  o Babys mouth is stretched open wide like a shout  o Babys sucking should feel like tugging to the mother  - The baby should be drinking at the breast:  o You should hear swallowing or gulping throughout the feeding  o You should see milk on the babys lips when he comes off the breast  o Your breasts should be softer when the baby is finished feeding  o The baby should look relaxed at the end of feedings  o After  the 4th day and your milk is in:  o The babys poop should turn bright yellow and be loose, watery, and seedy  o The baby should have at least 3-4 poops the size of the palm of your hand per day  o The baby should have at least 6-8 wet diapers per day  o The urine should be light yellow in color  You should drink when you are thirsty and eat a healthy diet when you are    hungry.     Take naps to get the rest you need.   Take medications and/or drink alcohol only with permission of your obstetrician    or the babys pediatrician.  You can also call the [facility] Risk [facility],   ([phone]), Monday-Friday, 8am-5pm Central time, to get the most   up-to-date evidence-based information on the use of medications during   pregnancy and breastfeeding.      The baby should be examined by a pediatrician at 3-5 days of age; unless ordered sooner by the pediatrician.  Once your milk comes in, the baby should be back to birth weight no later than 10-14 days of age.

## 2018-06-15 NOTE — PROGRESS NOTES
[facility]  Obstetrics  Postpartum Progress Note    Patient Name: Amanda Cazares  MRN: [id number]  Admission Date: 2018  Hospital Length of Stay: 1 days  Attending Physician: Geneva Joya MD  Primary Care Provider: Primary Doctor No    Subjective:     Principal Problem: (normal spontaneous vaginal delivery)    Hospital course: 13:35  Admitted to ABC unit for active labor  Will start atibiotics for POS GBS and expectant management  17 , male      Interval History: PPD#1    She is doing well this morning. She is tolerating a regular diet without nausea or vomiting. She is voiding spontaneously. She is ambulating. She has passed flatus, and has not a BM. Vaginal bleeding is mild. She denies fever or chills. Abdominal pain is mild and controlled with oral medications. She is breastfeeding.     Objective:     Vital Signs (Most Recent):  Temp: 97.3 °F (36.3 °C) (06/15/18 0700)  Pulse: 82 (06/15/18 0700)  Resp: 18 (06/15/18 0700)  BP: (!) 110/58 (06/15/18 0700)  SpO2: (!) 94 % (06/15/18 0700) Vital Signs (24h Range):  Temp:  [97.3 °F (36.3 °C)-99 °F (37.2 °C)] 97.3 °F (36.3 °C)  Pulse:  [] 82  Resp:  [17-18] 18  SpO2:  [94 %-96 %] 94 %  BP: (108-146)/(58-75) 110/58     Weight: 76 kg (167 lb 7 oz)  Body mass index is 28.24 kg/m².      Intake/Output Summary (Last 24 hours) at 06/15/18 0945  Last data filed at 06/15/18 0000   Gross per 24 hour   Intake              360 ml   Output             1250 ml   Net             -890 ml       Significant Labs:  Lab Results   Component Value Date    GROUPTRH O POS 2018    HEPBSAG NR 2017       Recent Labs  Lab 18  1400   HGB 12.7   HCT 37.1       CBC:   Recent Labs  Lab 18  1400   WBC 14.50*   RBC 4.11   HGB 12.7   HCT 37.1   *   MCV 90   MCH 30.9   MCHC 34.2     I have personallly reviewed all pertinent lab results from the last 24 hours.    Physical Exam:   Constitutional: She is oriented to person, place, and  time. She appears well-developed and well-nourished.       Cardiovascular: Normal rate.     Pulmonary/Chest: Effort normal.        Abdominal: Soft.             Musculoskeletal: Normal range of motion.       Neurological: She is alert and oriented to person, place, and time.    Skin: Skin is warm and dry.    Psychiatric: She has a normal mood and affect.   breasts soft and non-tender, nipples intact    Assessment/Plan:     22 y.o. female  for:    *  (normal spontaneous vaginal delivery)    PPD#1  Normal involution  Breastfeeding well  Desires d/c home today, will d/c if baby discharged.  Otherwise plan d/c in am.  Discharge instructions reviewed        GBS (group b Streptococcus) UTI complicating pregnancy, first trimester    IV antibiotics given during labor            Disposition: As patient meets milestones, will plan to discharge today or tomorrow when baby discharged.    Yomaira Luz CNM  Obstetrics  [facility]

## 2018-06-15 NOTE — LACTATION NOTE
06/15/18 1405   Maternal Infant Assessment   Breast Shape round   Breast Density soft   Areola elastic   Nipple(s) everted   Infant Assessment   Sucking Reflex present   Rooting Reflex present   Swallow Reflex present   LATCH Score   Latch 2-->grasps breast, tongue down, lips flanged, rhythmic sucking   Audible Swallowing 2-->spontaneous and intermittent (24 hrs old)   Type Of Nipple 2-->everted (after stimulation)   Comfort (Breast/Nipple) 2-->soft/nontender   Hold (Positioning) 2-->no assist from staff, mother able to position/hold infant   Score (less than 7 for 2/more consecutive times, consult Lactation Consultant) 10   Pain/Comfort Assessments   Pain Assessment Performed Yes       Number Scale   Presence of Pain denies   Maternal Infant Feeding   Maternal Emotional State independent;relaxed   Infant Positioning cross-cradle   Signs of Milk Transfer audible swallow;infant jaw motion present   Presence of Pain no   Time Spent (min) 0-15 min   Latch Assistance no   Breastfeeding History   Currently Breastfeeding yes   Feeding Infant   Feeding Tolerance/Success alert for feeding   Effective Latch During Feeding yes   Audible Swallow yes   Suck/Swallow Coordination present   Skin-to-Skin Contact During Feeding no  (encouraged)   Lactation Referrals   Lactation Consult Breastfeeding assessment;Follow up;Knowledge deficit   Lactation Interventions   Attachment Promotion counseling provided;family involvement promoted;skin-to-skin contact encouraged   Breastfeeding Assistance feeding cue recognition promoted;feeding on demand promoted;both breasts offered each feeding;feeding session observed;infant latch-on verified;infant suck/swallow verified;support offered   Maternal Breastfeeding Support maternal rest encouraged;maternal nutrition promoted;maternal hydration promoted;encouragement offered;diary/feeding log utilized   Pt nursing infant in cross cradle on L side, with infant deeply latched, audible swallows,  "and rocker jaw motion. Pt reports "pulling feeling" in breast. LC provided encouragement and support. LC reviewed Breastfeeding Guide and completed lactation discharge education. First Alert Questionnaire reviewed and danger signs to call MD. Pt and FOB's questions answered and Lactation Warmline number along with community resource list provided.   "

## 2018-06-15 NOTE — PLAN OF CARE
Problem: Patient Care Overview  Goal: Plan of Care Review  Outcome: Ongoing (interventions implemented as appropriate)  Based on pt's stated feeding goals, the Plan of care for today is for pt to breastfeed do skin-to-skin, frequently on demand, to observe for signs of effective milk transfer, to monitor voids and stools, and to call for assistance. Pt and FOB verbalized understanding.

## 2018-06-15 NOTE — NURSING
[facility]  ABC Transfer Note      2018    Transfer From: ABC to MBU  Transfer Indication: GBBS + tx<4hrs   Transfer via wheelchair   Transfer with RN  Time decision was made to transfer: at delivery     Time of consults or notification of receiving provider/ unit: 1620  Time of arrival to transfer location: 1830  Transported by:JB Bloom  Medicines sent: n/a  Medications administered in ABC: PCN  Interventions performed in ABC:n/a  Maternal- Infant Interaction if  Transport: infant transported with mother for 48 hr obs  Accompanied by: sig other    Upon arrival to floor:  Pt was oriented to room, floor and call bell   Report given to: on coming shift   Outcome of transfer: n/a    Type of Birth   Indication of  if applicable:     Condition of Mother: stable    Condition of Infant: stable     APGARS: 6/9  [place] Assessment    No data filed          Any maternal or  complications including NICU/ ICU admission: n/a

## 2018-06-16 VITALS
RESPIRATION RATE: 18 BRPM | TEMPERATURE: 97 F | OXYGEN SATURATION: 98 % | WEIGHT: 167.44 LBS | DIASTOLIC BLOOD PRESSURE: 54 MMHG | BODY MASS INDEX: 27.9 KG/M2 | HEIGHT: 65 IN | SYSTOLIC BLOOD PRESSURE: 90 MMHG | HEART RATE: 71 BPM

## 2018-06-16 PROBLEM — O23.41 GBS (GROUP B STREPTOCOCCUS) UTI COMPLICATING PREGNANCY, FIRST TRIMESTER: Status: RESOLVED | Noted: 2017-11-07 | Resolved: 2018-06-16

## 2018-06-16 PROBLEM — B95.1 GBS (GROUP B STREPTOCOCCUS) UTI COMPLICATING PREGNANCY, FIRST TRIMESTER: Status: RESOLVED | Noted: 2017-11-07 | Resolved: 2018-06-16

## 2018-06-16 PROCEDURE — 25000003 PHARM REV CODE 250: Performed by: ADVANCED PRACTICE MIDWIFE

## 2018-06-16 PROCEDURE — 99238 HOSP IP/OBS DSCHRG MGMT 30/<: CPT | Mod: ,,, | Performed by: ADVANCED PRACTICE MIDWIFE

## 2018-06-16 RX ORDER — DOCUSATE SODIUM 100 MG/1
200 CAPSULE, LIQUID FILLED ORAL 2 TIMES DAILY PRN
Qty: 14 CAPSULE | Refills: 0 | Status: SHIPPED | OUTPATIENT
Start: 2018-06-16 | End: 2018-06-16

## 2018-06-16 RX ORDER — IBUPROFEN 600 MG/1
600 TABLET ORAL EVERY 6 HOURS PRN
Qty: 28 TABLET | Refills: 0 | Status: SHIPPED | OUTPATIENT
Start: 2018-06-16 | End: 2018-06-16

## 2018-06-16 RX ORDER — IBUPROFEN 600 MG/1
600 TABLET ORAL EVERY 6 HOURS PRN
Qty: 28 TABLET | Refills: 0 | Status: SHIPPED | OUTPATIENT
Start: 2018-06-16 | End: 2018-06-23

## 2018-06-16 RX ORDER — DOCUSATE SODIUM 100 MG/1
200 CAPSULE, LIQUID FILLED ORAL 2 TIMES DAILY PRN
Qty: 14 CAPSULE | Refills: 0 | Status: SHIPPED | OUTPATIENT
Start: 2018-06-16 | End: 2018-07-26

## 2018-06-16 RX ADMIN — IBUPROFEN 600 MG: 600 TABLET ORAL at 08:06

## 2018-06-16 RX ADMIN — DOCUSATE SODIUM 200 MG: 100 CAPSULE, LIQUID FILLED ORAL at 08:06

## 2018-06-16 NOTE — DISCHARGE SUMMARY
"[facility]  Obstetrics  Discharge Summary      Patient Name: Amanda Cazares  MRN: [id number]  Admission Date: 2018  Hospital Length of Stay: 2 days  Discharge Date and Time:  2018 12:31 PM  Attending Physician: Geneva Joya MD   Discharging Provider: Samia Rizzo CNM  Primary Care Provider: Primary Doctor Barbara    HPI:   CC: Post-partum Discharge note, S/P NSVB    Subjective: Amanda Cazares is a 22 y.o. female  s/p a .  Eating, urinating and defecating.   Reports small lochia, no complaints.  Breast Feeding: YES  Depression: NO  Perineum: healing well per pt.   : Doing well, will F/U with pediatrician.  Desires Paragard for contraception.        Objective: BP (!) 90/54 (BP Location: Right arm, Patient Position: Lying) Comment: pt laying flat sleeping Comment (Patient Position): paitent was sleeping  Pulse 71   Temp 97.3 °F (36.3 °C) (Oral)   Resp 18   Ht 5' 4.57" (1.64 m)   Wt 76 kg (167 lb 7 oz)   LMP 2017 (Approximate)   SpO2 98%   Breastfeeding? Yes   BMI 28.24 kg/m²   GENERAL: No fever, chills, fatigability or weight loss.  BREASTS:soft/NTD, nipples intact  ABDOMEN: No abdominal pain. Denies nausea. Denies vomiting. No diarrhea. No constipation. Uterus firm,  2 below umbilicus.  PERINEUM: not inspected  LOCHIA: small.       Assessment:  1. Care and examination of lactating mother    2. Normal labor    3.  (normal spontaneous vaginal delivery)        Plan:   1. Reviewed PP recommendations and precautions. To RTO if excess bleeding,   2. Rx: Ibuprofen and Colace   3. Contraception: Plan for Paragard placement @ 6 wks   4. RTO 6 weeks - PPV with CNMs.  5. Discharge pt home with baby.      Hospital Course:   13:35  Admitted to ABC unit for active labor  Will start atibiotics for POS GBS and expectant management  17 , male  18: Normal PP period - pt discharge today.         Final Active Diagnoses:    Diagnosis Date Noted POA    " PRINCIPAL PROBLEM:   (normal spontaneous vaginal delivery) [O80] 2018 Not Applicable    Care and examination of lactating mother [Z39.1] 2018 Not Applicable      Problems Resolved During this Admission:    Diagnosis Date Noted Date Resolved POA    Normal labor [O80, Z37.9] 2018 06/15/2018 Not Applicable    Normal labor [O80, Z37.9] 2018 Not Applicable    GBS (group b Streptococcus) UTI complicating pregnancy, first trimester [O23.43, B95.1] 2017 Yes        Feeding Method: breast    Immunizations     Date Immunization Status Dose Route/Site Given by    18 1625 MMR Incomplete 0.5 mL Subcutaneous/Left deltoid     18 162 Tdap Incomplete 0.5 mL Intramuscular/Left deltoid           Delivery:    Episiotomy: None   Lacerations: None   Repair suture: None   Repair # of packets:     Blood loss (ml): 100     Birth information:  YOB: 2019   Time of birth: 3:02 PM   Sex: male   Delivery type: Vaginal, Spontaneous Delivery   Gestational Age: 40w5d    Delivery Clinician:      Other providers:       Additional  information:  Forceps:    Vacuum:    Breech:    Observed anomalies      Living?:           APGARS  One minute Five minutes Ten minutes   Skin color:         Heart rate:         Grimace:         Muscle tone:         Breathing:         Totals: 6  9        Placenta: Delivered:       appearance    Pending Diagnostic Studies:     None          Discharged Condition: good    Disposition: Home or Self Care    Follow Up:  Follow-up Information     [facility] - [facility] In 6 weeks.    Specialty:  Obstetrics and Gynecology  Why:  PP visit - Paragard placement   Contact information:  [address]  [facility] [address] [phone]  Additional information:  [facility] [address]   Please park in [address].               Patient Instructions:     Diet Adult Regular     Activity as tolerated     Notify your health care  provider if you experience any of the following:  temperature >100.4     Notify your health care provider if you experience any of the following:  severe uncontrolled pain     Notify your health care provider if you experience any of the following:  persistent nausea and vomiting or diarrhea     Notify your health care provider if you experience any of the following:  redness, tenderness, or signs of infection (pain, swelling, redness, odor or green/yellow discharge around incision site)     Notify your health care provider if you experience any of the following:  difficulty breathing or increased cough     Notify your health care provider if you experience any of the following:  severe persistent headache     Notify your health care provider if you experience any of the following:  worsening rash     Notify your health care provider if you experience any of the following:  persistent dizziness, light-headedness, or visual disturbances     Notify your health care provider if you experience any of the following:  increased confusion or weakness     Notify your health care provider if you experience any of the following:       Medications:  Current Discharge Medication List      START taking these medications    Details   docusate sodium (COLACE) 100 MG capsule Take 2 capsules (200 mg total) by mouth 2 (two) times daily as needed for Constipation.  Qty: 14 capsule, Refills: 0      ibuprofen (ADVIL,MOTRIN) 600 MG tablet Take 1 tablet (600 mg total) by mouth every 6 (six) hours as needed.  Qty: 28 tablet, Refills: 0         CONTINUE these medications which have NOT CHANGED    Details   magnesium oxide (MAG-OX) 400 mg tablet Take 400 mg by mouth once daily.      omega-3 fatty acids fish oil 1,050-1,200 mg Cap capsule Take 1 capsule by mouth once daily.      PRENATAL VIT,CALC76/IRON/FOLIC (PNV 29-1 ORAL) Take 1 tablet by mouth once daily.         STOP taking these medications       clotrimazole (LOTRIMIN) 1 % cream  Comments:   Reason for Stopping:         metroNIDAZOLE (FLAGYL) 500 MG tablet Comments:   Reason for Stopping:               Samia Rizzo CNM  Obstetrics  [facility]

## 2018-06-16 NOTE — LACTATION NOTE
06/16/18 0845   Maternal Infant Assessment   Breast Density Bilateral:;soft   LATCH Score   Latch 2-->grasps breast, tongue down, lips flanged, rhythmic sucking   Audible Swallowing 1-->a few with stimulation   Type Of Nipple 2-->everted (after stimulation)   Comfort (Breast/Nipple) 2-->soft/nontender   Hold (Positioning) 2-->no assist from staff, mother able to position/hold infant   Score (less than 7 for 2/more consecutive times, consult Lactation Consultant) 9   Breasts WDL   Breasts WDL WDL       Number Scale   Presence of Pain denies   Pain Rating: Rest 0   Pain Rating: Activity 0   Maternal Infant Feeding   Maternal Emotional State independent;relaxed   Presence of Pain no   Time Spent (min) 15-30 min   Latch Assistance no   Breastfeeding Education adequate infant intake;adequate milk volume;diet;importance of skin-to-skin contact   Feeding Infant   Effective Latch During Feeding yes   Lactation Referrals   Lactation Consult Follow up   Lactation Interventions   Breastfeeding Assistance feeding cue recognition promoted;feeding on demand promoted   Maternal Breastfeeding Support diary/feeding log utilized;encouragement offered;infant-mother separation minimized;lactation counseling provided;maternal hydration promoted;maternal nutrition promoted;maternal rest encouraged   discharge education reviewed. Pt has no concerns or questions. Nursing infant a this time. Baby nursing well.

## 2018-06-16 NOTE — PLAN OF CARE
Problem: Patient Care Overview  Goal: Plan of Care Review  Outcome: Outcome(s) achieved Date Met: 06/16/18  VS stable. Patient is ambulating, voiding and eating well. Pain is controlled with oral pain medication. Vaginal bleeding is within normal limits. Discharge education reviewed with patient. All additional questions have been answered. Patient verbalizes understanding. Parents ID bands have been verified with infant's ID bands. Paperwork signed.

## 2018-06-22 ENCOUNTER — TELEPHONE (OUTPATIENT)
Dept: PERINATAL CARE | Facility: OTHER | Age: 23
End: 2018-06-22

## 2018-06-22 NOTE — PROGRESS NOTES
Pt called and states she needs her birth certificate in 2 weeks, and still does not have it. States there is a problem c provider signature.  Birth certificate office called, and states problem was resolved and [facility]  Pt notified that certificate was corrected the address and phone number given. Pt told to call if any other complications arise.

## 2018-06-26 ENCOUNTER — PATIENT MESSAGE (OUTPATIENT)
Dept: OBSTETRICS AND GYNECOLOGY | Facility: CLINIC | Age: 23
End: 2018-06-26

## 2018-06-27 ENCOUNTER — HOSPITAL ENCOUNTER (EMERGENCY)
Facility: OTHER | Age: 23
Discharge: HOME | End: 2018-06-27
Payer: MEDICAID

## 2018-06-27 VITALS
OXYGEN SATURATION: 99 % | RESPIRATION RATE: 16 BRPM | TEMPERATURE: 100 F | HEART RATE: 112 BPM | SYSTOLIC BLOOD PRESSURE: 114 MMHG | DIASTOLIC BLOOD PRESSURE: 72 MMHG

## 2018-06-27 DIAGNOSIS — M54.50 ACUTE MIDLINE LOW BACK PAIN WITHOUT SCIATICA: Primary | ICD-10-CM

## 2018-06-27 LAB
BACTERIA #/AREA URNS HPF: NORMAL /HPF
BASOPHILS # BLD AUTO: 0.02 K/UL
BASOPHILS NFR BLD: 0.2 %
BILIRUB UR QL STRIP: NEGATIVE
CLARITY UR: CLEAR
COLOR UR: YELLOW
DIFFERENTIAL METHOD: ABNORMAL
EOSINOPHIL # BLD AUTO: 0 K/UL
EOSINOPHIL NFR BLD: 0 %
ERYTHROCYTE [DISTWIDTH] IN BLOOD BY AUTOMATED COUNT: 12.5 %
GLUCOSE UR QL STRIP: NEGATIVE
HCT VFR BLD AUTO: 38.2 %
HGB BLD-MCNC: 12.7 G/DL
HGB UR QL STRIP: ABNORMAL
KETONES UR QL STRIP: NEGATIVE
LEUKOCYTE ESTERASE UR QL STRIP: ABNORMAL
LYMPHOCYTES # BLD AUTO: 1.7 K/UL
LYMPHOCYTES NFR BLD: 14.1 %
MCH RBC QN AUTO: 30.2 PG
MCHC RBC AUTO-ENTMCNC: 33.2 G/DL
MCV RBC AUTO: 91 FL
MICROSCOPIC COMMENT: NORMAL
MONOCYTES # BLD AUTO: 0.4 K/UL
MONOCYTES NFR BLD: 3.3 %
NEUTROPHILS # BLD AUTO: 10.1 K/UL
NEUTROPHILS NFR BLD: 82.2 %
NITRITE UR QL STRIP: NEGATIVE
PH UR STRIP: 6 [PH] (ref 5–8)
PLATELET # BLD AUTO: 240 K/UL
PMV BLD AUTO: 10.9 FL
PROT UR QL STRIP: NEGATIVE
RBC # BLD AUTO: 4.21 M/UL
RBC #/AREA URNS HPF: 0 /HPF (ref 0–4)
SP GR UR STRIP: <=1.005 (ref 1–1.03)
SQUAMOUS #/AREA URNS HPF: 1 /HPF
URN SPEC COLLECT METH UR: ABNORMAL
UROBILINOGEN UR STRIP-ACNC: NEGATIVE EU/DL
WBC # BLD AUTO: 12.28 K/UL
WBC #/AREA URNS HPF: 2 /HPF (ref 0–5)

## 2018-06-27 PROCEDURE — 87086 URINE CULTURE/COLONY COUNT: CPT

## 2018-06-27 PROCEDURE — 99284 EMERGENCY DEPT VISIT MOD MDM: CPT

## 2018-06-27 PROCEDURE — 25000003 PHARM REV CODE 250: Performed by: OBSTETRICS & GYNECOLOGY

## 2018-06-27 PROCEDURE — 87147 CULTURE TYPE IMMUNOLOGIC: CPT

## 2018-06-27 PROCEDURE — 81000 URINALYSIS NONAUTO W/SCOPE: CPT

## 2018-06-27 PROCEDURE — 87088 URINE BACTERIA CULTURE: CPT

## 2018-06-27 PROCEDURE — 85025 COMPLETE CBC W/AUTO DIFF WBC: CPT

## 2018-06-27 RX ORDER — CEFUROXIME AXETIL 250 MG/1
500 TABLET ORAL ONCE
Status: COMPLETED | OUTPATIENT
Start: 2018-06-27 | End: 2018-06-27

## 2018-06-27 RX ORDER — CEFUROXIME AXETIL 500 MG/1
500 TABLET ORAL EVERY 12 HOURS
Qty: 14 TABLET | Refills: 0 | Status: SHIPPED | OUTPATIENT
Start: 2018-06-27 | End: 2018-07-04

## 2018-06-27 RX ADMIN — CEFUROXIME AXETIL 500 MG: 250 TABLET ORAL at 10:06

## 2018-06-28 NOTE — ED PROVIDER NOTES
Encounter Date: 2018       History     Chief Complaint   Patient presents with    Fever     Patient is a 21yo  s/p uncomplicated  on  presenting with c/o of fever at home for one week. Patient reports a temperature as high as 102.9 today.  She reports intermittent cramping but denies pelvic pain, vaginal bleeding, abnormal discharge, or dysuria. Reports normal lochia without a foul smelling odor.  States she did have engorgement of her breast one week ago that has now improved.  No other complaints. In [place] she is afebrile, at 99 degrees F.           Review of patient's allergies indicates:  No Known Allergies  No past medical history on file.  Past Surgical History:   Procedure Laterality Date    NOSE SURGERY       Family History   Problem Relation Age of Onset    Breast cancer Maternal Grandmother         Dx later in life    Miscarriages / Stillbirths Maternal Grandmother     Colon cancer Neg Hx      labor Neg Hx     Ovarian cancer Neg Hx      Social History   Substance Use Topics    Smoking status: Never Smoker    Smokeless tobacco: Never Used    Alcohol use No     Review of Systems   Constitutional: Positive for fever. Negative for chills and fatigue.   HENT: Negative for congestion.    Eyes: Negative for visual disturbance.   Respiratory: Negative for cough and shortness of breath.    Cardiovascular: Negative for chest pain and palpitations.   Gastrointestinal: Negative for abdominal distention, abdominal pain, constipation, diarrhea, nausea and vomiting.   Genitourinary: Positive for vaginal discharge (normal lochia). Negative for difficulty urinating, dysuria, hematuria and vaginal bleeding.   Skin: Negative for rash.   Neurological: Negative for dizziness, seizures, light-headedness and headaches.   Hematological: Does not bruise/bleed easily.   Psychiatric/Behavioral: Negative for dysphoric mood. The patient is not nervous/anxious.        Physical Exam     Initial Vitals  [06/27/18 2114]   BP Pulse Resp Temp SpO2   114/72 (!) 112 16 99.9 °F (37.7 °C) 99 %      MAP       --         Physical Exam    Vitals reviewed.  Constitutional: She appears well-developed and well-nourished. No distress.   HENT:   Head: Normocephalic and atraumatic.   Cardiovascular: Normal rate and regular rhythm.   Breast exam performed, no engorgement/cords/redness or warmth bilaterally.    Pulmonary/Chest: No respiratory distress.   Abdominal: She exhibits no distension. There is no tenderness. There is no rebound and no guarding.   Genitourinary: Uterus normal. Vaginal discharge found.   Genitourinary Comments: SSE: normal lochia present, odorless. No bleeding.   SVE: No CMT or fundal tenderness.    Bedside US performed showing normal uterus, with no retained products.    Neurological: She is alert and oriented to person, place, and time.   Psychiatric: She has a normal mood and affect. Thought content normal.         ED Course   Procedures  Labs Reviewed   CBC W/ AUTO DIFFERENTIAL - Abnormal; Notable for the following:        Result Value    Gran # (ANC) 10.1 (*)     Gran% 82.2 (*)     Lymph% 14.1 (*)     Mono% 3.3 (*)     All other components within normal limits   URINALYSIS - Abnormal; Notable for the following:     Specific Gravity, UA <=1.005 (*)     Occult Blood UA 1+ (*)     Leukocytes, UA 2+ (*)     All other components within normal limits   CULTURE, URINE   CULTURE, URINE   URINALYSIS MICROSCOPIC          Imaging Results    None          Medical Decision Making:   Initial Assessment:   Fever  Urinary tract infection postpartum  Clinical Tests:   Lab Tests: Ordered and Reviewed  ED Management:  Patient with reports fevers at home. Afebrile in [place].    Benign breast/pelvic exam.    CBC wnl  UA +2 Leuks/+1 blood. Urine culture sent.  Bedside ultrasound performed, normal uterus with no evidence of retained products.  Will give rx for Ceftin. First dose given in triage.                       Clinical  Impression:   The encounter diagnosis was Postpartum urinary tract infection.      Disposition:   Disposition: Discharged                        Kristy Vickers MD  Resident  06/27/18 5304

## 2018-06-29 ENCOUNTER — TELEPHONE (OUTPATIENT)
Dept: OBSTETRICS AND GYNECOLOGY | Facility: CLINIC | Age: 23
End: 2018-06-29

## 2018-06-29 LAB — BACTERIA UR CULT: NORMAL

## 2018-06-29 NOTE — TELEPHONE ENCOUNTER
Returned call. Pt requested a CPT code to inquire about IUD coverage. Instructed pt to come to clinic to [facility] IUD benefit verification form. Pt stated that she wants to talk to Dr. Saleh about IUD before signing form. Pt requested a return call next week. Forwarded message to Dr. Saleh.

## 2018-06-29 NOTE — TELEPHONE ENCOUNTER
Returned call to pt.  Pt provided with COT code for iud insertion         ----- Message from Hillary Humphreys sent at 6/29/2018 11:13 AM CDT -----  Contact: pt            Name of Who is Calling: RON BRINK [[id number]]    What is the request in detail: pt needs a CPT code for IUD.. Please advise      Can the clinic reply by [facility]: no      What Number to Call Back if not in [facility]:[phone]

## 2018-06-29 NOTE — TELEPHONE ENCOUNTER
----- Message from Ray Camejo sent at 6/29/2018 11:33 AM CDT -----  Contact: pt            Name of Who is Calling: pt      What is the request in detail: pt is needing the codes for the IUD that Dr. Saleh orders. Pt is needing information for her insurance company. Pt states it urgent that she receives the information today      Can the clinic reply by [facility]: no      [facility]: [phone]

## 2018-07-16 ENCOUNTER — TELEPHONE (OUTPATIENT)
Dept: OBSTETRICS AND GYNECOLOGY | Facility: CLINIC | Age: 23
End: 2018-07-16

## 2018-07-16 NOTE — TELEPHONE ENCOUNTER
----- Message from Savannah Arvizu LPN sent at 7/16/2018 11:31 AM CDT -----  Contact: self      ----- Message -----  From: Alexa Gage  Sent: 7/16/2018  10:56 AM  To: Delfino PATEL Staff    Pt wants to speak with someone about getting an IUD. She can be reached at [phone]

## 2018-07-18 ENCOUNTER — TELEPHONE (OUTPATIENT)
Dept: OBSTETRICS AND GYNECOLOGY | Facility: CLINIC | Age: 23
End: 2018-07-18

## 2018-07-18 NOTE — TELEPHONE ENCOUNTER
Emailed IUD form to pt. She signed and emailed back to the clinic. Faxed IUD benefit verification form.

## 2018-07-18 NOTE — TELEPHONE ENCOUNTER
----- Message from Savannah Arvizu LPN sent at 7/16/2018  4:44 PM CDT -----  Please have her sign papers for iud  07/16/2018

## 2018-07-24 ENCOUNTER — TELEPHONE (OUTPATIENT)
Dept: OBSTETRICS AND GYNECOLOGY | Facility: OTHER | Age: 23
End: 2018-07-24

## 2018-07-24 NOTE — TELEPHONE ENCOUNTER
----- Message from Dalton Arroyo sent at 7/24/2018  2:35 PM CDT -----  Contact: RON BRINK [[id number]]    Name of Who is Calling: RON BRINK [[id number]]      What is the request in detail: Patient would like to speak with staff in regards to knowing if there is another document besides birth certicate; that prove the paternal of the father.      Can the clinic reply by [facility]: no      What Number to Call Back if not in [facility]: [phone]

## 2018-07-24 NOTE — TELEPHONE ENCOUNTER
returned call to pt.  Left vm message advising birth certificate does not the paternity of the father

## 2018-07-25 ENCOUNTER — TELEPHONE (OUTPATIENT)
Dept: OBSTETRICS AND GYNECOLOGY | Facility: CLINIC | Age: 23
End: 2018-07-25

## 2018-07-25 NOTE — TELEPHONE ENCOUNTER
----- Message from Donna Ballard sent at 7/25/2018  2:09 PM CDT -----  Contact: Amanda  Name of Who is Calling: Amanda      What is the request in detail: Patient was returning a missed call back from the staff       Can the clinic reply by [facility]: no    What [facility]: [phone]

## 2018-07-26 ENCOUNTER — POSTPARTUM VISIT (OUTPATIENT)
Dept: OBSTETRICS AND GYNECOLOGY | Facility: CLINIC | Age: 23
End: 2018-07-26
Payer: MEDICAID

## 2018-07-26 VITALS
WEIGHT: 145.19 LBS | BODY MASS INDEX: 24.19 KG/M2 | DIASTOLIC BLOOD PRESSURE: 76 MMHG | HEIGHT: 65 IN | SYSTOLIC BLOOD PRESSURE: 124 MMHG

## 2018-07-26 DIAGNOSIS — Z30.430 ENCOUNTER FOR IUD INSERTION: ICD-10-CM

## 2018-07-26 DIAGNOSIS — Z97.5 IUD (INTRAUTERINE DEVICE) IN PLACE: Primary | ICD-10-CM

## 2018-07-26 PROCEDURE — 99999 PR PBB SHADOW E&M-EST. PATIENT-LVL II: CPT | Mod: PBBFAC,,, | Performed by: ADVANCED PRACTICE MIDWIFE

## 2018-07-26 PROCEDURE — 99212 OFFICE O/P EST SF 10 MIN: CPT | Mod: PBBFAC | Performed by: ADVANCED PRACTICE MIDWIFE

## 2018-07-26 PROCEDURE — 58300 INSERT INTRAUTERINE DEVICE: CPT | Mod: S$PBB,,, | Performed by: ADVANCED PRACTICE MIDWIFE

## 2018-07-26 PROCEDURE — 58300 INSERT INTRAUTERINE DEVICE: CPT | Mod: PBBFAC | Performed by: ADVANCED PRACTICE MIDWIFE

## 2018-07-26 RX ADMIN — COPPER 1 EACH: 313.4 INTRAUTERINE DEVICE INTRAUTERINE at 12:07

## 2018-07-26 NOTE — PROGRESS NOTES
"Amanda Cazares is a 22 y.o.  is here for a postpartum visit.  Estimated Date of Delivery: None noted.     DELIVERY HISTORY   delivery on 18 at term gestation, LMI infant, weight 3515 g, no lacerations. Breastfeeding infant. Brings baby boy with her to visit.     C/C: Postpartum exam.     Pregnancy was c/b by:  Patient Active Problem List    Diagnosis Date Noted    Postpartum care and examination 2018    Encounter for IUD insertion 2018    Care and examination of lactating mother 2018     (normal spontaneous vaginal delivery) 2018    Zika virus exposure affecting pregnancy 2018     HPI:  Doing well; ambulating and tolerating regular diet  Lochia: none/stopped   Vaginal pain: denies  Abdominal pain: denies   Breasts/nipples:  breastfeeding well without difficulty and infant is gaining appropriately per their pediatrician  Bowel/bladder function: normal/normal  Depression/anxiety: denies ; EPDS score: 3  Support at home: adequate  Contraception: considering paragard IUD which has had in the past with much satisfaction    Pap hx:  negative    No past medical history on file.  Past Surgical History:   Procedure Laterality Date    NOSE SURGERY       Review of patient's allergies indicates:  No Known Allergies    Current Outpatient Prescriptions:     omega-3 fatty acids fish oil 1,050-1,200 mg Cap capsule, Take 1 capsule by mouth once daily., Disp: , Rfl:     PRENATAL VIT,CALC76/IRON/FOLIC (PNV 29-1 ORAL), Take 1 tablet by mouth once daily., Disp: , Rfl:     PE:  OB History    Para Term  AB Living   1 1 1     1   SAB TAB Ectopic Multiple Live Births           1      # Outcome Date GA Lbr Zac/2nd Weight Sex Delivery Anes PTL Lv   1 Term 18 40w5d 07:45 / 00:47 3.78 kg (8 lb 5.3 oz) M Vag-Spont None N LINDA         Vitals:    18 1037   BP: 124/76     /76   Ht 5' 4.5" (1.638 m)   Wt 65.9 kg (145 lb 2.8 oz)   Breastfeeding? Yes   BMI " 24.53 kg/m²   Gen: A&O x 4, NAD  Neck: non-tender, not enlarged, no masses or nodules  CV: normal HR  Lungs: normal resp effort  Breasts: lactating , bilaterally: no masses, non-tender, nipples intact  Abdomen: soft, nontender, and nondistended, no diastasis recti   Vulva/Vagina: healed well, approximated, non-tender.   Speculum: cervix appears closed, no abn lesions, no abn discharge  Bimanual: uterus involuted, non-tender, neg CMT, adnexa free bilaterally  Pap Smear today? Deferred    See separate IUD insertion note    ASSESSMENT/PLAN:  Postpartum exam s/p  delivery  -- Counseling regarding resuming normal activities of exercise and work.  -- Postpartum precautions reviewed  -- Reviewed pap guidelines. Pap due no later than 2020  Continue annual exams; return then or sooner if any symptoms of pp depression or any other problem.    Birth control counseling  --- Reviewed r/b/a of methods. She desires paragard IUD. See separate note.    Breast Feeding  -- Continue PNV while breastfeeding.      UPDATED MED LIST:  Current Outpatient Prescriptions   Medication Sig Dispense Refill    omega-3 fatty acids fish oil 1,050-1,200 mg Cap capsule Take 1 capsule by mouth once daily.      PRENATAL VIT,CALC76/IRON/FOLIC (PNV 29-1 ORAL) Take 1 tablet by mouth once daily.       No current facility-administered medications for this visit.        Allison Orellana CNM/NP  201812:39 PM

## 2018-07-26 NOTE — PROCEDURES
IUD INSERTION OFFICE NOTE    CC:   Pt presents today for paragard IUD insertion.  No intercourse since prior to delivery    HPI:    Pt is in a monogamous relationship and understands infection risk if that should change at some point in time. Reviewed risks and benefits of IUD use as well as insertion risks. Advised that like all surgical procedures there are risks including but not limited to bleeding, infection and uterine injury. That would include but not be limited to perforation with injury to surrounding organs. Discussed increased ectopic risk with pregnancy.    Patient continues to request the paragard IUD, and reports she has had all her questions answered to her satisfaction, written consent is signed. She would like to proceed with procedure.    Review of patient's allergies indicates:   Allergen Reactions    Shellfish containing products        O:  VS WNL  UPT negative    IUD INSERTION PROCEDURE NOTE    Time out was performed prior to IUD insertion.    The pt was placed in the lithotomy position. Bimanual exam was done indicating that the uterus was midline and normal size with no tenderness. The cervix was visualized using a speculum and chloraprep was used to clean the cervix.  A single toothed tenaculum was applied to the anterior lip of the cervix.  The uterus was sounded to a depth of  8 cm. The IUD was prepared for insertion using the 's instructions. IUD depth marker adjusted according to the measurement obtained with the uterine sound. IUD inserted and insertor removed.  Tenaculum removed from the cervix. Paragard IUD string clipped leaving approximately 2-3 cm of string visible. Bleeding controlled with pressure and silver nitrate - hemostasis achieved. Pt tolerated procedure well.     A:   Paragard IUD insertion, device in place  NDC 49085-734-43  LOT [id number]    P:  --Reviewed risks and benefits of IUD use as well as insertion risks. Discussed expected side effects and bleeding  patterns following insertion.   --Pt taught how to check IUD strings. Advised to avoid sexual intercourse for at least 3 days, use a back-up method for first week following insertion. Continue condom use or safe sex practices if any risk of exposure to STDs.  --Advised she may have increased bleeding with the copper IUD.   --Patient instructed to call the office if she develops fever, foul smelling discharge, severe pelvic pain, or heavy bleeding, or if she has any questions or concerns.   --Return to office in one month for recheck of IUD, or if she cannot feel the strings or feels strings to be longer than expected.  --Advised garrick IUD expires in 3 years; mirena IUD expires in 5 years; copper IUD expires in 10 years.  -- RTC x 1 month, continue annual exams        Allison Orellana CNM/NP  7/26/2018 12:42 PM

## 2018-08-23 ENCOUNTER — PROCEDURE VISIT (OUTPATIENT)
Dept: OBSTETRICS AND GYNECOLOGY | Facility: CLINIC | Age: 23
End: 2018-08-23
Payer: COMMERCIAL

## 2018-08-23 VITALS — SYSTOLIC BLOOD PRESSURE: 106 MMHG | DIASTOLIC BLOOD PRESSURE: 70 MMHG

## 2018-08-23 DIAGNOSIS — Z97.5 IUD (INTRAUTERINE DEVICE) IN PLACE: ICD-10-CM

## 2018-08-23 PROCEDURE — 99211 OFF/OP EST MAY X REQ PHY/QHP: CPT | Mod: S$GLB,,, | Performed by: ADVANCED PRACTICE MIDWIFE

## 2018-08-23 NOTE — PROGRESS NOTES
Here for an IUD string check. She had the paragard IUD inserted on 7/26/18.  She reports that she has had no issues  She denies any abnormal vaginal discharge or odor, and states she has no pelvic pain, dyspareunia, fever or chills. Reports normal voiding and defecatory function.     Exam:   /70   Breastfeeding? Yes   Abdomen: soft, nontender  External genitalia: No lesions or discharge, normal hair distribution  Vagina: scant red discharge, no foul odor, normal rugae  Cervix: no lesions, scant discharge. IUD string visualized at os, ~ 2-3 cm in length. Negative CMT  Uterus: mobile, NT, normal size, shape, and contour  Adnexae: no masses or tenderness bilaterally    Assesment:  IUD check with IUD in place    Plan:  --IUD string visualized on exam. Patient instructed in when and how to check her own strings.  --Discussed symptoms that require provider attention. She is asked to call if any fever, pain, signs of ectopic or other pregnancy, or any suspicion that there has been an expulsion of the device.  --Follow up for her annual exam or sooner as needed.       Allison Orellana CNM/NP  8/23/2018  11:32 AM

## 2019-01-17 NOTE — TELEPHONE ENCOUNTER
----- Message from Britney Pena sent at 6/22/2018  1:14 PM CDT -----  Contact: RON BRINK [[id number]]            Name of Who is Calling: RON BRINK [[id number]]      What is the request in detail: birth certificate office called staff,pt requesting call back       Can the clinic reply by [facility]: no      What [facility]: [phone]                                    
(4) walks frequently

## 2019-07-17 ENCOUNTER — OFFICE VISIT (OUTPATIENT)
Dept: OBSTETRICS AND GYNECOLOGY | Facility: CLINIC | Age: 24
End: 2019-07-17
Payer: COMMERCIAL

## 2019-07-17 VITALS
SYSTOLIC BLOOD PRESSURE: 102 MMHG | HEIGHT: 65 IN | WEIGHT: 130.38 LBS | DIASTOLIC BLOOD PRESSURE: 64 MMHG | BODY MASS INDEX: 21.72 KG/M2

## 2019-07-17 DIAGNOSIS — Z97.5 IUD (INTRAUTERINE DEVICE) IN PLACE: ICD-10-CM

## 2019-07-17 DIAGNOSIS — R21 VULVAR RASH: ICD-10-CM

## 2019-07-17 DIAGNOSIS — Z01.419 ENCOUNTER FOR GYNECOLOGICAL EXAMINATION WITHOUT ABNORMAL FINDING: Primary | ICD-10-CM

## 2019-07-17 DIAGNOSIS — K64.9 HEMORRHOIDS, UNSPECIFIED HEMORRHOID TYPE: ICD-10-CM

## 2019-07-17 PROBLEM — Z30.430 ENCOUNTER FOR IUD INSERTION: Status: RESOLVED | Noted: 2018-07-26 | Resolved: 2019-07-17

## 2019-07-17 PROBLEM — O99.891 ZIKA VIRUS EXPOSURE AFFECTING PREGNANCY: Status: RESOLVED | Noted: 2018-01-11 | Resolved: 2019-07-17

## 2019-07-17 PROBLEM — Z20.821 ZIKA VIRUS EXPOSURE AFFECTING PREGNANCY: Status: RESOLVED | Noted: 2018-01-11 | Resolved: 2019-07-17

## 2019-07-17 LAB
B-HCG UR QL: NEGATIVE
CTP QC/QA: YES

## 2019-07-17 PROCEDURE — 99999 PR PBB SHADOW E&M-EST. PATIENT-LVL III: CPT | Mod: PBBFAC,,, | Performed by: ADVANCED PRACTICE MIDWIFE

## 2019-07-17 PROCEDURE — 99395 PREV VISIT EST AGE 18-39: CPT | Mod: 25,S$GLB,, | Performed by: ADVANCED PRACTICE MIDWIFE

## 2019-07-17 PROCEDURE — 99999 PR PBB SHADOW E&M-EST. PATIENT-LVL III: ICD-10-PCS | Mod: PBBFAC,,, | Performed by: ADVANCED PRACTICE MIDWIFE

## 2019-07-17 PROCEDURE — 99395 PR PREVENTIVE VISIT,EST,18-39: ICD-10-PCS | Mod: 25,S$GLB,, | Performed by: ADVANCED PRACTICE MIDWIFE

## 2019-07-17 PROCEDURE — 81025 POCT URINE PREGNANCY: ICD-10-PCS | Mod: S$GLB,,, | Performed by: ADVANCED PRACTICE MIDWIFE

## 2019-07-17 PROCEDURE — 81025 URINE PREGNANCY TEST: CPT | Mod: S$GLB,,, | Performed by: ADVANCED PRACTICE MIDWIFE

## 2019-07-17 NOTE — PROGRESS NOTES
CC: Well woman exam    Amanda Cazares is a 23 y.o. female  presents for well woman exam.  No LMP recorded.  Reports she is still breast feeding her 13mo old, although planning to wean.  She has a Paragard IUD in place, and has not resumed menses since delivery. She is happy with this method of contraception. C/O vaginal dryness with intercourse, but denies pain. Declines STD screening today.    Concerned regarding rectal itching/hemorrhoids.  Reports noted some vulvar reddness approx 1mo ago - reports this is itchy and seems worse at night. She has a history of Psoriasis and believes this may be a flare related to that. She does not desire to try a steroid cream, especially hydrocortisone cream, as reports this has made other plaques worse in the past.     Reports hemorrhoids have bothered her since delivery of her son, she has not done anything to treat them. They have also become more bothersome/itchy in the last month. No rectal bleeding. Some constipation. Denies pain.    Last pap: 2017 - NILM. Next pap due 2020 per ASCCP guidelines.    History reviewed. No pertinent past medical history.  Past Surgical History:   Procedure Laterality Date    NOSE SURGERY       Social History     Socioeconomic History    Marital status: Single     Spouse name: Not on file    Number of children: Not on file    Years of education: Not on file    Highest education level: Not on file   Occupational History    Not on file   Social Needs    Financial resource strain: Not on file    Food insecurity:     Worry: Not on file     Inability: Not on file    Transportation needs:     Medical: Not on file     Non-medical: Not on file   Tobacco Use    Smoking status: Never Smoker    Smokeless tobacco: Never Used   Substance and Sexual Activity    Alcohol use: No    Drug use: No    Sexual activity: Yes     Partners: Male     Birth control/protection: None   Lifestyle    Physical activity:     Days per week: Not on file  "    Minutes per session: Not on file    Stress: Not on file   Relationships    Social connections:     Talks on phone: Not on file     Gets together: Not on file     Attends Mosque service: Not on file     Active member of club or organization: Not on file     Attends meetings of clubs or organizations: Not on file     Relationship status: Not on file   Other Topics Concern    Not on file   Social History Narrative    Partner Tee    First baby for both!    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 Yes   BMI 22.04 kg/m²       ROS:  GENERAL: Denies weight gain or weight loss. Feeling well overall.   SKIN: Denies rash or lesions.   HEAD: Denies head injury or headache.   NODES: Denies enlarged lymph nodes.   CHEST: Denies chest pain or shortness of breath.   CARDIOVASCULAR: Denies palpitations or left sided chest pain.   ABDOMEN: No abdominal pain, diarrhea, nausea, vomiting or rectal bleeding.   URINARY: No frequency, dysuria, hematuria, or burning on urination.  REPRODUCTIVE: See HPI.   BREASTS: The patient performs breast self-examination and denies pain, lumps, or abnormal nipple discharge.   HEMATOLOGIC: No easy bruisability or excessive bleeding.   MUSCULOSKELETAL: Denies joint pain or swelling.   NEUROLOGIC: Denies syncope or weakness.   PSYCHIATRIC: Denies depression, anxiety or mood swings.    PHYSICAL EXAM:  APPEARANCE: Well nourished, well developed, in no acute distress.  AFFECT: Alert " and oriented x 3  SKIN: Warm, dry, & intact. No acne or hirsutism.  NECK: Neck symmetric without masses or thyromegaly  NODES: No cervical, axillary, epitrochlear, inguinal, or femoral lymph node enlargement  CHEST: Good respiratory effect.  ABDOMEN: Soft.  No tenderness or masses.  No hepatosplenomegaly.  No hernias.  BREASTS: Deferred - breast feeding  PELVIC:   Abnormal external genitalia: erythematous pattern to perineum/retal area as outlined below. Nontender but appears somewhat thickened and with sparse, small excoriations. No abnormal lesions, nontender, no drainage. She shaves  area. Also has approx two erythematous, plaque-like, irregular areas which are approx 1-2cm and irregularly rounded to mons region on left side.    In addition exhibits two, pink/flesh colored hemorrhoids - nontender. One is approx tip-of-pinky size, and one is size of a medium blueberry.     Adequate perineal body, normal urethral meatus.  Vagina moist and well rugated without lesions or discharge.    Cervix pink, without lesions, discharge or tenderness - IUD threads visualized - lengthened and very tangled/knotted. Approx 1in trimmed off threads, leaving them approx 2-3cm in length. No portion of IUD palpable.   No significant cystocele or rectocele.    Bimanual exam shows uterus to be normal size, regular, mobile and nontender.  Adnexa without masses or tenderness.    EXTREMITIES: No edema.    ASSESSMENT/PLAN:  Encounter for gynecological examination without abnormal finding       -      Patient was counseled today on A.C.S. Pap guidelines and recommendations for yearly pelvic exams, mammograms and monthly self breast exams; to see her PCP for other health maintenance       -      Next pap due next year. Resume breast exams following cessation of breast feeding. Reviewed self breast awareness and warning s/s.        -      Education re: use of water-base lubricant with intercourse - reviewed common frequency of vaginal dryness  with breast feeding.    IUD (intrauterine device) in place - threads lengthened  -     US Pelvis Complete Non OB; Future; Expected date: 07/17/2019  -     POCT urine pregnancy - negative  -     Recommend use of condoms as back up contraception method until appropriate IUD placement confirmed with ultrasound.    Vulvar rash       -      Discussed concerns for possible psoriasis flare vs lichens - recommend she F/U with her Dermatologist regarding this for further evaluation and treatment. Reviewed potential need for biopsy and avoid new soaps/lotions/irritation substances to area. Reviewed potential benefits of steroid cream, she declines until further consult with Derm.    Hemorrhoids, unspecified hemorrhoid type       -     Education regarding diet/lifestyle modifications, along with OTC treatments and warning s/s.    Follow up in about 1 year (around 7/17/2020).

## 2019-07-18 NOTE — PROGRESS NOTES
Subjective:       Patient ID: Amanda Cazares is a 23 y.o. female.    Chief Complaint:  No chief complaint on file.      History of Present Illness  HPI  {OBG HPI BLOCKS:34190}    GYN & OB History  No LMP recorded.   Date of Last Pap: No result found    OB History    Para Term  AB Living   1 1 1     1   SAB TAB Ectopic Multiple Live Births           1      # Outcome Date GA Lbr Zac/2nd Weight Sex Delivery Anes PTL Lv   1 Term 18 40w5d 07:45 / 00:47 3.78 kg (8 lb 5.3 oz) M Vag-Spont None N LINDA       Review of Systems  Review of Systems        Objective:    Physical Exam:               Genitourinary:                              Assessment:        1. Encounter for gynecological examination without abnormal finding    2. IUD (intrauterine device) in place - threads lengthened    3. Vulvar rash       ***         Plan:      ***

## 2019-07-18 NOTE — PATIENT INSTRUCTIONS
Hemorrhoids    Hemorrhoids are swollen and inflamed veins inside the rectum and near the anus. The rectum is the last several inches of the colon. The anus is the passage between the rectum and the outside of the body.  Causes  The veins can become swollen due to increased pressure in them. This is most often caused by:  · Chronic constipation or diarrhea  · Straining when having a bowel movement  · Sitting too long on the toilet  · A low-fiber diet  · Pregnancy  Symptoms  · Bleeding from the rectum (this may be noticeable after bowel movements)  · Lump near the anus  · Itching around the anus  · Pain around the anus  There are different types of hemorrhoids. Depending on the type you have and the severity, you may be able to treat yourself at home. In some cases, a procedure may be the best treatment option. Your healthcare provider can tell you more about this, if needed.  Home care  General care  · To get relief from pain or itching, try:  ¨ Topical products. Your healthcare provider may prescribe or recommend creams, ointments, or pads that can be applied to the hemorrhoid. Use these exactly as directed.  ¨ Medicines. Your healthcare provider may recommend stool softeners, suppositories, or laxatives to help manage constipation. Use these exactly as directed.  ¨ Sitz baths. A sitz bath involves sitting in a few inches of warm bath water. Be careful not to make the water so hot that you burn yourself--test it before sitting in it. Soak for about 10 to 15 minutes a few times a day. This may help relieve pain.  Tips to help prevent hemorrhoids  · Eat more fiber. Fiber adds bulk to stool and absorbs water as it moves through your colon. This makes stool softer and easier to pass.  ¨ Increase the fiber in your diet with more fiber-rich foods. These include fresh fruit, vegetables, and whole grains.  ¨ Take a fiber supplement or bulking agent, if advised to by your provider. These include products such as psyllium  or methylcellulose.  · Drink plenty of water, if directed to by your provider. This can help keep stool soft.  · Be more active. Frequent exercise aids digestion and helps prevent constipation. It may also help make bowel movements more regular.  · Dont strain during bowel movements. This can make hemorrhoids more likely. Also, dont sit on the toilet for long periods of time.  Follow-up care  Follow up with your healthcare provider, or as advised. If a culture or imaging tests were done, you will be notified of the results when they are ready. This may take a few days or longer.  When to seek medical advice  Call your healthcare provider right away if any of these occur:  · Increased bleeding from the rectum  · Increased pain around the rectum or anus  · Weakness or dizziness  Call 911  Call 911 or return to the emergency department right away if any of these occur:  · Trouble breathing or swallowing  · Fainting or loss of consciousness  · Unusually fast heart rate  · Vomiting blood  · Large amounts of blood in stool  Date Last Reviewed: 6/22/2015 © 2000-2017 [employer]. [address] [place] [address], [place] [address]. All rights reserved. This information is not intended as a substitute for professional medical care. Always follow your healthcare professional's instructions.

## 2019-07-19 ENCOUNTER — TELEPHONE (OUTPATIENT)
Dept: DERMATOLOGY | Facility: CLINIC | Age: 24
End: 2019-07-19

## 2019-07-19 ENCOUNTER — TELEPHONE (OUTPATIENT)
Dept: RADIOLOGY | Facility: HOSPITAL | Age: 24
End: 2019-07-19

## 2019-07-19 NOTE — TELEPHONE ENCOUNTER
----- Message from Geovanna Wolfe sent at 7/19/2019  4:29 PM CDT -----  Contact: Pt  Pt is requesting an appt due to rash on private area    Pt can be reached at [phone]

## 2019-07-22 ENCOUNTER — TELEPHONE (OUTPATIENT)
Dept: DERMATOLOGY | Facility: CLINIC | Age: 24
End: 2019-07-22

## 2019-07-25 ENCOUNTER — OFFICE VISIT (OUTPATIENT)
Dept: DERMATOLOGY | Facility: CLINIC | Age: 24
End: 2019-07-25
Payer: COMMERCIAL

## 2019-07-25 DIAGNOSIS — L01.1 IMPETIGINIZATION OF OTHER DERMATOSES: ICD-10-CM

## 2019-07-25 DIAGNOSIS — L40.0 PSORIASIS VULGARIS: Primary | ICD-10-CM

## 2019-07-25 PROCEDURE — 87147 CULTURE TYPE IMMUNOLOGIC: CPT

## 2019-07-25 PROCEDURE — 87070 CULTURE OTHR SPECIMN AEROBIC: CPT

## 2019-07-25 PROCEDURE — 87186 SC STD MICRODIL/AGAR DIL: CPT

## 2019-07-25 PROCEDURE — 87077 CULTURE AEROBIC IDENTIFY: CPT | Mod: 59

## 2019-07-25 PROCEDURE — 99203 OFFICE O/P NEW LOW 30 MIN: CPT | Mod: S$GLB,,, | Performed by: DERMATOLOGY

## 2019-07-25 PROCEDURE — 87075 CULTR BACTERIA EXCEPT BLOOD: CPT

## 2019-07-25 PROCEDURE — 99203 PR OFFICE/OUTPT VISIT, NEW, LEVL III, 30-44 MIN: ICD-10-PCS | Mod: S$GLB,,, | Performed by: DERMATOLOGY

## 2019-07-25 RX ORDER — DICLOXACILLIN SODIUM 250 MG/1
250 CAPSULE ORAL EVERY 6 HOURS
Qty: 40 CAPSULE | Refills: 0 | Status: SHIPPED | OUTPATIENT
Start: 2019-07-25 | End: 2021-12-14

## 2019-07-25 RX ORDER — TRIAMCINOLONE ACETONIDE 0.25 MG/G
OINTMENT TOPICAL
Qty: 80 G | Refills: 5 | Status: SHIPPED | OUTPATIENT
Start: 2019-07-25 | End: 2021-12-14

## 2019-07-25 NOTE — PROGRESS NOTES
Subjective:       Patient ID:  Amanda Cazares is a 23 y.o. female who presents for   Chief Complaint   Patient presents with    Psoriasis     groin, ears, 8 years      Psoriasis  - Initial  Affected locations: right ear, left ear, groin and scalp  Duration: 3 months (has had psoriasis for 8 years)  Signs / symptoms: itching, scaling and redness  Severity: mild  Timing: constant  Aggravated by: heat (high glycemic index foods)  Relieving factors/Treatments tried: Rx topical steroids (improved with low GI diet but hard to maintain)  Improvement on treatment: no relief    Review of Systems   Constitutional: Negative for fever.   HENT: Negative for sore throat.    Gastrointestinal: Negative for diarrhea.   Musculoskeletal: Negative for arthralgias.   Skin: Positive for itching and rash.        Objective:    Physical Exam   Constitutional: She appears well-developed and well-nourished. No distress.   HENT:   Mouth/Throat: Lips normal.    Eyes: Lids are normal.  No conjunctival no injection.   Genitourinary:       There is rash on the right labia. There is rash on the left labia.   Neurological: She is alert and oriented to person, place, and time. She is not disoriented.   Psychiatric: She has a normal mood and affect.   Skin:   Areas Examined (abnormalities noted in diagram):   Scalp / Hair Palpated and Inspected  Head / Face Inspection Performed  Neck Inspection Performed  Chest / Axilla Inspection Performed  Genitals / Buttocks / Groin Inspection Performed  RUE Inspected  LUE Inspection Performed  Nails and Digits Inspection Performed              Diagram Legend     Erythematous scaling macule/papule c/w actinic keratosis       Vascular papule c/w angioma      Pigmented verrucoid papule/plaque c/w seborrheic keratosis      Yellow umbilicated papule c/w sebaceous hyperplasia      Irregularly shaped tan macule c/w lentigo     1-2 mm smooth white papules consistent with Milia      Movable subcutaneous cyst with  punctum c/w epidermal inclusion cyst      Subcutaneous movable cyst c/w pilar cyst      Firm pink to brown papule c/w dermatofibroma      Pedunculated fleshy papule(s) c/w skin tag(s)      Evenly pigmented macule c/w junctional nevus     Mildly variegated pigmented, slightly irregular-bordered macule c/w mildly atypical nevus      Flesh colored to evenly pigmented papule c/w intradermal nevus       Pink pearly papule/plaque c/w basal cell carcinoma      Erythematous hyperkeratotic cursted plaque c/w SCC      Surgical scar with no sign of skin cancer recurrence      Open and closed comedones      Inflammatory papules and pustules      Verrucoid papule consistent consistent with wart     Erythematous eczematous patches and plaques     Dystrophic onycholytic nail with subungual debris c/w onychomycosis     Umbilicated papule    Erythematous-base heme-crusted tan verrucoid plaque consistent with inflamed seborrheic keratosis     Erythematous Silvery Scaling Plaque c/w Psoriasis     See annotation      Assessment / Plan:        Psoriasis vulgaris  Discussed that psoriasis is also associated with cardiovascular disease, including hypertension, diabetes mellitus, dyslipidemia, obesity, and metabolic syndrome, which increases her risk of heart attack and stroke. Recommended follow up with a primary care doctor for appropriate screening and for management of cardiovascular risk factors. Encouraged healthy diet, regular exercise, maintaining a normal BMI, and smoking cessation, if applicable.  -     triamcinolone acetonide 0.025% (KENALOG) 0.025 % Oint; AAA of face, groin, and underarms bid prn psoriasis.  Dispense: 80 g; Refill: 5    Impetiginization of other dermatoses  -     dicloxacillin (DYNAPEN) 250 MG capsule; Take 1 capsule (250 mg total) by mouth every 6 (six) hours.  Dispense: 40 capsule; Refill: 0  -     Aerobic culture  -     Culture, Anaerobic      Follow up in about 2 months (around 9/25/2019) for follow up, or  sooner if symptoms worsening or not improving.

## 2019-07-29 ENCOUNTER — PATIENT MESSAGE (OUTPATIENT)
Dept: DERMATOLOGY | Facility: CLINIC | Age: 24
End: 2019-07-29

## 2019-07-29 DIAGNOSIS — L01.1 IMPETIGINIZATION OF OTHER DERMATOSES: Primary | ICD-10-CM

## 2019-07-29 DIAGNOSIS — H60.391 OTHER INFECTIVE ACUTE OTITIS EXTERNA OF RIGHT EAR: ICD-10-CM

## 2019-07-29 LAB
BACTERIA SPEC AEROBE CULT: ABNORMAL
BACTERIA SPEC ANAEROBE CULT: NORMAL

## 2019-07-29 RX ORDER — CIPROFLOXACIN 0.5 MG/.25ML
SOLUTION/ DROPS AURICULAR (OTIC)
Qty: 28 VIAL | Refills: 0 | Status: SHIPPED | OUTPATIENT
Start: 2019-07-29 | End: 2021-12-14

## 2019-10-22 ENCOUNTER — OFFICE VISIT (OUTPATIENT)
Dept: OBSTETRICS AND GYNECOLOGY | Facility: CLINIC | Age: 24
End: 2019-10-22
Payer: COMMERCIAL

## 2019-10-22 VITALS
HEIGHT: 65 IN | SYSTOLIC BLOOD PRESSURE: 104 MMHG | WEIGHT: 128.31 LBS | BODY MASS INDEX: 21.38 KG/M2 | DIASTOLIC BLOOD PRESSURE: 68 MMHG

## 2019-10-22 DIAGNOSIS — N76.0 ACUTE VAGINITIS: ICD-10-CM

## 2019-10-22 DIAGNOSIS — Z00.00 EXAMINATION: Primary | ICD-10-CM

## 2019-10-22 LAB
B-HCG UR QL: NEGATIVE
CTP QC/QA: YES

## 2019-10-22 PROCEDURE — 87801 DETECT AGNT MULT DNA AMPLI: CPT

## 2019-10-22 PROCEDURE — 99999 PR PBB SHADOW E&M-EST. PATIENT-LVL II: CPT | Mod: PBBFAC,,, | Performed by: ADVANCED PRACTICE MIDWIFE

## 2019-10-22 PROCEDURE — 3008F BODY MASS INDEX DOCD: CPT | Mod: CPTII,S$GLB,, | Performed by: ADVANCED PRACTICE MIDWIFE

## 2019-10-22 PROCEDURE — 87661 TRICHOMONAS VAGINALIS AMPLIF: CPT

## 2019-10-22 PROCEDURE — 99213 PR OFFICE/OUTPT VISIT, EST, LEVL III, 20-29 MIN: ICD-10-PCS | Mod: 25,S$GLB,, | Performed by: ADVANCED PRACTICE MIDWIFE

## 2019-10-22 PROCEDURE — 81025 URINE PREGNANCY TEST: CPT | Mod: S$GLB,,, | Performed by: ADVANCED PRACTICE MIDWIFE

## 2019-10-22 PROCEDURE — 99213 OFFICE O/P EST LOW 20 MIN: CPT | Mod: 25,S$GLB,, | Performed by: ADVANCED PRACTICE MIDWIFE

## 2019-10-22 PROCEDURE — 81025 POCT URINE PREGNANCY: ICD-10-PCS | Mod: S$GLB,,, | Performed by: ADVANCED PRACTICE MIDWIFE

## 2019-10-22 PROCEDURE — 87481 CANDIDA DNA AMP PROBE: CPT | Mod: 59

## 2019-10-22 PROCEDURE — 99999 PR PBB SHADOW E&M-EST. PATIENT-LVL II: ICD-10-PCS | Mod: PBBFAC,,, | Performed by: ADVANCED PRACTICE MIDWIFE

## 2019-10-22 PROCEDURE — 3008F PR BODY MASS INDEX (BMI) DOCUMENTED: ICD-10-PCS | Mod: CPTII,S$GLB,, | Performed by: ADVANCED PRACTICE MIDWIFE

## 2019-10-22 NOTE — PROGRESS NOTES
Amanda Cazares is a 23 y.o. female  presents with complaint of vaginal discharge off and on but increasing in amount over last six weeks.  She denies itching, and denies odor.  She states the discharge is white.      ROS:  GENERAL: No fever, chills, fatigability or weight loss.  VULVAR: No pain, no lesions and no itching.  VAGINAL: No relaxation, no abnormal bleeding and no lesions.  ABDOMEN: No abdominal pain. Denies nausea. Denies vomiting. No diarrhea. No constipation  BREAST: Denies pain. No lumps. No discharge.  URINARY: No incontinence, no nocturia, no frequency and no dysuria.  CARDIOVASCULAR: No chest pain. No shortness of breath. No leg cramps.  NEUROLOGICAL: No headaches. No vision changes.      Review of patient's allergies indicates:   Allergen Reactions    Shellfish containing products        Current Outpatient Medications:     ciprofloxacin HCl 0.2 % otic solution, Instill contents of 1 container (0.25 mL) into each ear BID x 7 days. (Patient not taking: Reported on 10/22/2019), Disp: 28 vial, Rfl: 0    dicloxacillin (DYNAPEN) 250 MG capsule, Take 1 capsule (250 mg total) by mouth every 6 (six) hours. (Patient not taking: Reported on 10/22/2019), Disp: 40 capsule, Rfl: 0    omega-3 fatty acids fish oil 1,050-1,200 mg Cap capsule, Take 1 capsule by mouth once daily., Disp: , Rfl:     PRENATAL VIT,CALC76/IRON/FOLIC (PNV 29-1 ORAL), Take 1 tablet by mouth once daily., Disp: , Rfl:     triamcinolone acetonide 0.025% (KENALOG) 0.025 % Oint, AAA of face, groin, and underarms bid prn psoriasis. (Patient not taking: Reported on 10/22/2019), Disp: 80 g, Rfl: 5    History reviewed. No pertinent past medical history.  Past Surgical History:   Procedure Laterality Date    NOSE SURGERY       Social History     Tobacco Use    Smoking status: Never Smoker    Smokeless tobacco: Never Used   Substance Use Topics    Alcohol use: No    Drug use: No     OB History    Para Term  AB Living  "  1 1 1     1   SAB TAB Ectopic Multiple Live Births           1      # Outcome Date GA Lbr Zac/2nd Weight Sex Delivery Anes PTL Lv   1 Term 06/14/18 40w5d 07:45 / 00:47 3.78 kg (8 lb 5.3 oz) M Vag-Spont None N LINDA       /68   Ht 5' 4.5" (1.638 m)   Wt 58.2 kg (128 lb 4.9 oz)   Breastfeeding? No   BMI 21.68 kg/m²     PHYSICAL EXAM:  GENERAL: Calm and appropriate affect, alert, oriented x4  SKIN: Color appropriate for race, warm and dry, clean and intact with no rashes.  RESP: Even, unlabored breathing  ABDOMEN: Soft, nontender, no masses.  :   Normal external female genitalia without lesions. Normal hair distribution. Adequate perineal body, normal urethral meatus.  Vagina pink and well rugated, no lesions, large amount white vaginal discharge with no foul odor.   No significant cystocele or rectocele.  Cervix pink without discharge or lesions, no cervical motion tenderness.  Uterus 4-6 weeks size, regular, mobile and nontender.  Adnexa: normal adnexa in size, nontender and no masses    ASSESSMENT / PLAN:  Examination  -     POCT Urine Pregnancy    Acute vaginitis  -     Vaginosis Screen by DNA Probe          Patient was counseled today on vaginitis prevention including :  a. avoiding feminine products such as deoderant soaps, body wash, bubble bath, douches, scented toilet paper, deoderant tampons or pads, feminine wipes, chronic pad use, etc.  b. avoiding other vulvovaginal irritants such as long hot baths, humidity, tight, synthetic clothing, chlorine and sitting around in wet bathing suits  c. wearing cotton underwear, avoiding thong underwear and no underwear to bed  d. taking showers instead of baths and use a hair dryer on cool setting afterwards to dry  e. wearing cotton to exercise and shower immediately after exercise and change clothes  f. using polyurethane condoms without spermicide if sexually active and symptoms are triggered by intercourse  g. Discussed use of vagisil, along with repHresh " and probiotics    FOLLOW UP:   Pending lab results, PRN lack of improvement.

## 2019-10-23 LAB
BACTERIAL VAGINOSIS DNA: NEGATIVE
CANDIDA GLABRATA DNA: NEGATIVE
CANDIDA KRUSEI DNA: NEGATIVE
CANDIDA RRNA VAG QL PROBE: POSITIVE
T VAGINALIS RRNA GENITAL QL PROBE: NEGATIVE

## 2021-04-16 ENCOUNTER — PATIENT MESSAGE (OUTPATIENT)
Dept: RESEARCH | Facility: HOSPITAL | Age: 26
End: 2021-04-16

## 2021-11-04 NOTE — TELEPHONE ENCOUNTER
----- Message from Geovanna Wolfe sent at 7/19/2019  4:29 PM CDT -----  Contact: Pt  Pt is requesting an appt due to rash on private area    Pt can be reached at [phone]   [facility] Counseling:  I discussed with the patient the risks of ustekinumab including but not limited to immunosuppression, malignancy, posterior leukoencephalopathy syndrome, and serious infections.  The patient understands that monitoring is required including a PPD at baseline and must alert us or the primary physician if symptoms of infection or other concerning signs are noted.

## 2021-12-14 ENCOUNTER — LAB VISIT (OUTPATIENT)
Dept: LAB | Facility: OTHER | Age: 26
End: 2021-12-14
Attending: MIDWIFE
Payer: MEDICAID

## 2021-12-14 ENCOUNTER — OFFICE VISIT (OUTPATIENT)
Dept: OBSTETRICS AND GYNECOLOGY | Facility: CLINIC | Age: 26
End: 2021-12-14
Payer: MEDICAID

## 2021-12-14 VITALS
DIASTOLIC BLOOD PRESSURE: 62 MMHG | HEIGHT: 65 IN | SYSTOLIC BLOOD PRESSURE: 106 MMHG | WEIGHT: 134.5 LBS | BODY MASS INDEX: 22.41 KG/M2

## 2021-12-14 DIAGNOSIS — N94.10 DYSPAREUNIA IN FEMALE: ICD-10-CM

## 2021-12-14 DIAGNOSIS — N89.8 VAGINAL ODOR: ICD-10-CM

## 2021-12-14 DIAGNOSIS — Z01.419 WELL WOMAN EXAM WITH ROUTINE GYNECOLOGICAL EXAM: Primary | ICD-10-CM

## 2021-12-14 DIAGNOSIS — R53.83 FATIGUE, UNSPECIFIED TYPE: ICD-10-CM

## 2021-12-14 DIAGNOSIS — Z30.431 IUD CHECK UP: ICD-10-CM

## 2021-12-14 LAB
25(OH)D3+25(OH)D2 SERPL-MCNC: 30 NG/ML (ref 30–96)
B-HCG UR QL: NEGATIVE
BASOPHILS # BLD AUTO: 0.04 K/UL (ref 0–0.2)
BASOPHILS NFR BLD: 0.7 % (ref 0–1.9)
CTP QC/QA: YES
DIFFERENTIAL METHOD: NORMAL
EOSINOPHIL # BLD AUTO: 0 K/UL (ref 0–0.5)
EOSINOPHIL NFR BLD: 0.6 % (ref 0–8)
ERYTHROCYTE [DISTWIDTH] IN BLOOD BY AUTOMATED COUNT: 12.8 % (ref 11.5–14.5)
HCT VFR BLD AUTO: 37.7 % (ref 37–48.5)
HGB BLD-MCNC: 12.2 G/DL (ref 12–16)
IMM GRANULOCYTES # BLD AUTO: 0.01 K/UL (ref 0–0.04)
IMM GRANULOCYTES NFR BLD AUTO: 0.2 % (ref 0–0.5)
LYMPHOCYTES # BLD AUTO: 1.9 K/UL (ref 1–4.8)
LYMPHOCYTES NFR BLD: 35.3 % (ref 18–48)
MCH RBC QN AUTO: 29 PG (ref 27–31)
MCHC RBC AUTO-ENTMCNC: 32.4 G/DL (ref 32–36)
MCV RBC AUTO: 90 FL (ref 82–98)
MONOCYTES # BLD AUTO: 0.3 K/UL (ref 0.3–1)
MONOCYTES NFR BLD: 5.9 % (ref 4–15)
NEUTROPHILS # BLD AUTO: 3.1 K/UL (ref 1.8–7.7)
NEUTROPHILS NFR BLD: 57.3 % (ref 38–73)
NRBC BLD-RTO: 0 /100 WBC
PLATELET # BLD AUTO: 179 K/UL (ref 150–450)
PMV BLD AUTO: 12.1 FL (ref 9.2–12.9)
RBC # BLD AUTO: 4.21 M/UL (ref 4–5.4)
T4 FREE SERPL-MCNC: 0.96 NG/DL (ref 0.71–1.51)
TSH SERPL DL<=0.005 MIU/L-ACNC: 2.01 UIU/ML (ref 0.4–4)
WBC # BLD AUTO: 5.38 K/UL (ref 3.9–12.7)

## 2021-12-14 PROCEDURE — 82306 VITAMIN D 25 HYDROXY: CPT | Performed by: MIDWIFE

## 2021-12-14 PROCEDURE — 85025 COMPLETE CBC W/AUTO DIFF WBC: CPT | Performed by: MIDWIFE

## 2021-12-14 PROCEDURE — 84443 ASSAY THYROID STIM HORMONE: CPT | Performed by: MIDWIFE

## 2021-12-14 PROCEDURE — 88175 CYTOPATH C/V AUTO FLUID REDO: CPT | Performed by: MIDWIFE

## 2021-12-14 PROCEDURE — 84439 ASSAY OF FREE THYROXINE: CPT | Performed by: MIDWIFE

## 2021-12-14 PROCEDURE — 99395 PREV VISIT EST AGE 18-39: CPT | Mod: S$PBB,,, | Performed by: MIDWIFE

## 2021-12-14 PROCEDURE — 99999 PR PBB SHADOW E&M-EST. PATIENT-LVL III: CPT | Mod: PBBFAC,,, | Performed by: MIDWIFE

## 2021-12-14 PROCEDURE — 87481 CANDIDA DNA AMP PROBE: CPT | Mod: 59 | Performed by: MIDWIFE

## 2021-12-14 PROCEDURE — 36415 COLL VENOUS BLD VENIPUNCTURE: CPT | Performed by: MIDWIFE

## 2021-12-14 PROCEDURE — 99395 PR PREVENTIVE VISIT,EST,18-39: ICD-10-PCS | Mod: S$PBB,,, | Performed by: MIDWIFE

## 2021-12-14 PROCEDURE — 99213 OFFICE O/P EST LOW 20 MIN: CPT | Mod: PBBFAC | Performed by: MIDWIFE

## 2021-12-14 PROCEDURE — 99999 PR PBB SHADOW E&M-EST. PATIENT-LVL III: ICD-10-PCS | Mod: PBBFAC,,, | Performed by: MIDWIFE

## 2021-12-16 DIAGNOSIS — B37.31 CANDIDA VAGINITIS: Primary | ICD-10-CM

## 2021-12-16 DIAGNOSIS — N76.0 BACTERIAL VAGINOSIS: ICD-10-CM

## 2021-12-16 DIAGNOSIS — B96.89 BACTERIAL VAGINOSIS: ICD-10-CM

## 2021-12-16 LAB
BACTERIAL VAGINOSIS DNA: POSITIVE
CANDIDA GLABRATA DNA: NEGATIVE
CANDIDA KRUSEI DNA: NEGATIVE
CANDIDA RRNA VAG QL PROBE: POSITIVE
T VAGINALIS RRNA GENITAL QL PROBE: NEGATIVE

## 2021-12-16 RX ORDER — FLUCONAZOLE 150 MG/1
150 TABLET ORAL DAILY
Qty: 1 TABLET | Refills: 0 | Status: SHIPPED | OUTPATIENT
Start: 2021-12-16 | End: 2021-12-17

## 2021-12-16 RX ORDER — METRONIDAZOLE 500 MG/1
500 TABLET ORAL EVERY 12 HOURS
Qty: 14 TABLET | Refills: 0 | Status: SHIPPED | OUTPATIENT
Start: 2021-12-16 | End: 2021-12-23

## 2021-12-17 ENCOUNTER — PATIENT MESSAGE (OUTPATIENT)
Dept: OBSTETRICS AND GYNECOLOGY | Facility: CLINIC | Age: 26
End: 2021-12-17
Payer: MEDICAID

## 2021-12-17 LAB
CYTOLOGIST CVX/VAG CYTO: NORMAL
CYTOLOGY CVX/VAG DOC CYTO: NORMAL
CYTOLOGY CVX/VAG DOC THIN PREP: NORMAL
CYTOLOGY THINPREP PAP COMMENT: NORMAL
CYTOLOGY THINPREP PAP COMMENT: NORMAL
PAP NOTE: NORMAL
STAT OF ADQ CVX/VAG CYTO-IMP: NORMAL

## 2021-12-21 ENCOUNTER — HOSPITAL ENCOUNTER (OUTPATIENT)
Dept: RADIOLOGY | Facility: OTHER | Age: 26
Discharge: HOME | End: 2021-12-21
Attending: MIDWIFE
Payer: MEDICAID

## 2021-12-21 ENCOUNTER — TELEPHONE (OUTPATIENT)
Dept: OBSTETRICS AND GYNECOLOGY | Facility: CLINIC | Age: 26
End: 2021-12-21
Payer: MEDICAID

## 2021-12-21 ENCOUNTER — PATIENT MESSAGE (OUTPATIENT)
Dept: OBSTETRICS AND GYNECOLOGY | Facility: CLINIC | Age: 26
End: 2021-12-21
Payer: MEDICAID

## 2021-12-21 DIAGNOSIS — N94.10 DYSPAREUNIA IN FEMALE: ICD-10-CM

## 2021-12-21 PROCEDURE — 76830 TRANSVAGINAL US NON-OB: CPT | Mod: 26,,, | Performed by: RADIOLOGY

## 2021-12-21 PROCEDURE — 76856 US PELVIS COMP WITH TRANSVAG NON-OB (XPD): ICD-10-PCS | Mod: 26,,, | Performed by: RADIOLOGY

## 2021-12-21 PROCEDURE — 76830 US PELVIS COMP WITH TRANSVAG NON-OB (XPD): ICD-10-PCS | Mod: 26,,, | Performed by: RADIOLOGY

## 2021-12-21 PROCEDURE — 76856 US EXAM PELVIC COMPLETE: CPT | Mod: 26,,, | Performed by: RADIOLOGY

## 2021-12-21 PROCEDURE — 76856 US EXAM PELVIC COMPLETE: CPT | Mod: TC

## 2021-12-30 ENCOUNTER — HOSPITAL ENCOUNTER (EMERGENCY)
Facility: HOSPITAL | Age: 26
Discharge: HOME | End: 2021-12-30
Attending: EMERGENCY MEDICINE
Payer: MEDICAID

## 2021-12-30 VITALS
TEMPERATURE: 99 F | OXYGEN SATURATION: 99 % | WEIGHT: 141.13 LBS | DIASTOLIC BLOOD PRESSURE: 57 MMHG | RESPIRATION RATE: 16 BRPM | BODY MASS INDEX: 23.84 KG/M2 | SYSTOLIC BLOOD PRESSURE: 93 MMHG | HEART RATE: 70 BPM

## 2021-12-30 DIAGNOSIS — T78.2XXA ANAPHYLAXIS, INITIAL ENCOUNTER: Primary | ICD-10-CM

## 2021-12-30 PROCEDURE — 96374 THER/PROPH/DIAG INJ IV PUSH: CPT

## 2021-12-30 PROCEDURE — 99284 EMERGENCY DEPT VISIT MOD MDM: CPT | Mod: 59,25

## 2021-12-30 PROCEDURE — 96372 THER/PROPH/DIAG INJ SC/IM: CPT | Mod: 59

## 2021-12-30 PROCEDURE — 25000003 PHARM REV CODE 250: Performed by: EMERGENCY MEDICINE

## 2021-12-30 PROCEDURE — 96375 TX/PRO/DX INJ NEW DRUG ADDON: CPT

## 2021-12-30 PROCEDURE — 63600175 PHARM REV CODE 636 W HCPCS: Performed by: EMERGENCY MEDICINE

## 2021-12-30 RX ORDER — FAMOTIDINE 10 MG/ML
40 INJECTION INTRAVENOUS
Status: COMPLETED | OUTPATIENT
Start: 2021-12-30 | End: 2021-12-30

## 2021-12-30 RX ORDER — DIPHENHYDRAMINE HYDROCHLORIDE 50 MG/ML
12.5 INJECTION INTRAMUSCULAR; INTRAVENOUS
Status: COMPLETED | OUTPATIENT
Start: 2021-12-30 | End: 2021-12-30

## 2021-12-30 RX ORDER — EPINEPHRINE 0.3 MG/.3ML
0.3 INJECTION SUBCUTANEOUS
Status: COMPLETED | OUTPATIENT
Start: 2021-12-30 | End: 2021-12-30

## 2021-12-30 RX ORDER — EPINEPHRINE 0.3 MG/.3ML
1 INJECTION SUBCUTANEOUS
Qty: 1 EACH | Refills: 1 | Status: SHIPPED | OUTPATIENT
Start: 2021-12-30 | End: 2022-12-30

## 2021-12-30 RX ORDER — PREDNISONE 50 MG/1
50 TABLET ORAL DAILY
Qty: 10 TABLET | Refills: 0 | Status: SHIPPED | OUTPATIENT
Start: 2021-12-30 | End: 2023-07-12

## 2021-12-30 RX ORDER — METHYLPREDNISOLONE SOD SUCC 125 MG
125 VIAL (EA) INJECTION
Status: COMPLETED | OUTPATIENT
Start: 2021-12-30 | End: 2021-12-30

## 2021-12-30 RX ORDER — FAMOTIDINE 20 MG/1
20 TABLET, FILM COATED ORAL 2 TIMES DAILY
Qty: 10 TABLET | Refills: 0 | Status: SHIPPED | OUTPATIENT
Start: 2021-12-30 | End: 2023-07-12

## 2021-12-30 RX ADMIN — DIPHENHYDRAMINE HYDROCHLORIDE 12.5 MG: 50 INJECTION INTRAMUSCULAR; INTRAVENOUS at 08:12

## 2021-12-30 RX ADMIN — FAMOTIDINE 40 MG: 10 INJECTION INTRAVENOUS at 08:12

## 2021-12-30 RX ADMIN — METHYLPREDNISOLONE SODIUM SUCCINATE 125 MG: 125 INJECTION, POWDER, FOR SOLUTION INTRAMUSCULAR; INTRAVENOUS at 08:12

## 2021-12-30 RX ADMIN — EPINEPHRINE 0.3 MG: 0.3 INJECTION INTRAMUSCULAR at 08:12

## 2021-12-30 NOTE — ED TRIAGE NOTES
Pt. Came in complaining of swelling to bilateral eyes and mouth. Pt. Also complains of tightness in her throat which all started this morning. Pt. Also complains of vaginal itching. Pt. Denies any pain at this time. Pt. Just finish taking flagyl for 7 days for a vaginal infection.

## 2022-01-01 ENCOUNTER — PATIENT MESSAGE (OUTPATIENT)
Dept: OBSTETRICS AND GYNECOLOGY | Facility: CLINIC | Age: 27
End: 2022-01-01
Payer: MEDICAID

## 2022-01-01 DIAGNOSIS — N94.10 DYSPAREUNIA IN FEMALE: Primary | ICD-10-CM

## 2022-01-04 ENCOUNTER — CLINICAL SUPPORT (OUTPATIENT)
Dept: REHABILITATION | Facility: HOSPITAL | Age: 27
End: 2022-01-04
Payer: MEDICAID

## 2022-01-04 DIAGNOSIS — R27.8 COORDINATION IMPAIRMENT: ICD-10-CM

## 2022-01-04 DIAGNOSIS — M62.89 PELVIC FLOOR TENSION: ICD-10-CM

## 2022-01-04 DIAGNOSIS — N94.10 DYSPAREUNIA IN FEMALE: ICD-10-CM

## 2022-01-04 PROCEDURE — 97112 NEUROMUSCULAR REEDUCATION: CPT | Mod: PN

## 2022-01-04 PROCEDURE — 97530 THERAPEUTIC ACTIVITIES: CPT | Mod: PN

## 2022-01-04 PROCEDURE — 97140 MANUAL THERAPY 1/> REGIONS: CPT | Mod: PN

## 2022-01-04 PROCEDURE — 97161 PT EVAL LOW COMPLEX 20 MIN: CPT | Mod: PN

## 2022-01-13 PROBLEM — M62.89 PELVIC FLOOR TENSION: Status: ACTIVE | Noted: 2022-01-13

## 2022-01-13 PROBLEM — R27.8 COORDINATION IMPAIRMENT: Status: ACTIVE | Noted: 2022-01-13

## 2022-01-13 NOTE — PATIENT INSTRUCTIONS
Home Exercise Program: 01/13/2022    DIAPHRAGMATIC BREATHING     The diaphragm is a dome shaped muscle that forms the floor of the rib cage. It is the most efficient muscle for breathing and relaxation, although most people are not used to using the diaphragm. Diaphragmatic or belly breathing is an important technique to learn because it helps settle down or relax the autonomic nervous system. The correct use of diaphragmatic breathing can help to quiet brain activity resulting in the relaxation of all the muscles and organs of the body. This is accomplished by slow rhythmic breathing concentrated in the diaphragm muscle rather than the chest.    360 Breath - Inhale long, slow and deep. You should feel as if your lower ribs are expanding in all directions like the way an umbrella opens. You should feel the belly, back and sides gently expand and you may notice a relaxation in the pelvic floor. If you notice that your belly is pulling up and flattening during your inhale - try to reverse this and allow your belly to gently expand during the inhale while it recoils and flattens during the exhale      Continue to breath like this for 5 minutes. Repeat 1 times/day.       1) Voluntary relaxation - spend time consciously relaxing muscles. Get in comfortable position and focus on relaxing all muscles around hips, low back, and abdominals, then pelvic floor. As you are relaxing pelvic floor muscles think about softening, opening, dropping, letting go. Some people describe like a flower whose petals are opening or like gently laying an egg. Spend 3-5 minutes doing this.  This can be a great position:      2) Diaphragmatic breathing - focus on circumferential expansion. Can do ssitting or laying on back. Should feel like your lower ribs expand in all directions as the abdominals and low back also gently expand and fill with the breath. The anus should gently drop away from you on inhalation. If you are sitting, you may feel  like the anus is floating down towards the seat. 15-20 breaths, or more.    Do above, 1x/day    Also begin to become aware of if/when holding pelvic muscle tension throughout the day and try to relax/let go

## 2022-01-13 NOTE — PLAN OF CARE
[facility]  Pelvic Health Physical Therapy Initial Evaluation    Date: 1/4/2022   Name: Amanda Cazares  Clinic Number: [id number]  Therapy Diagnosis:   Encounter Diagnoses   Name Primary?    Dyspareunia in female     Pelvic floor tension     Coordination impairment      Physician: Rubi Ordaz CNM    Physician Orders: PT Eval and Treat - Pelvic Floor PT   Medical Diagnosis from Referral: N94.10 (ICD-10-CM) - Dyspareunia in female  Evaluation Date: 1/4/2022  Authorization Period Expiration: 1/1/2023  Plan of Care Expiration: 4/4/2022  Visit # / Visits authorized: 1/ 1    Time In: 1610   Time Out: 1700  Total Appointment Time (timed & untimed codes): 50 minutes  Total Billing Time: 50    Precautions: universal    Subjective     Date of onset: 3-4 months ago     History of current condition - Amanda reports: Dyspareunia that began approx. 3-4 months ago. Occurs with deep penetration; denies insertion pain. Also reports worsening menstrual cycles the past 3-4 months. Had to take muscle relaxer approx 2 months ago due to abdominal pain. Is less severe now but causes daily abdominal pain. 1 child delivered vaginally 3 years ago. Currently has copper IUD; has had for 3 years.     OB/GYN History:  childbirth vaginal delivery, vaginal dryness, painful periods, painful vaginal penetration and pelvic pain  Sexually active? Yes  Pain with vaginal exams, intercourse or tampon use? Yes    Bladder/Bowel History:    Frequency of urination:   Daytime: Every 2-3 hours            Nighttime: 0-1   Difficulty initiating urine stream: No   Urine stream: strong   Complete emptying: Yes   Bladder leakage: No   Urinary Urgency: No     Frequency of bowel movements: 1-3 times a day   Difficulty initiating BM: occasionally   Quality/Shape of BM: [place] Stool Chart Type 3-4   Does Patient Feel Empty after BM? Yes   Fiber Supplements or Laxative Use?  No   Colon leakage: No    PAIN:  Location: lower abdomen,  low back, vaginal canal   Current 3/10, worst 9-10/10, best 0/10   Description: Aching and occasional Sharp  Aggravating Factors/Activities that cause symptoms: prior to and early during menstruation, with intercourse,    Easing Factors: massage, heating pad, rest and muscle relaxer, stretching     Medical History: Amanda  has no past medical history on file.     Surgical History:  Amanda Cazares  has a past surgical history that includes Nose surgery.    Medications: Amanda has a current medication list which includes the following prescription(s): epinephrine, famotidine, and prednisone.    Allergies:   Review of patient's allergies indicates:   Allergen Reactions    Flagyl [metronidazole] Anaphylaxis    Shellfish containing products         Imaging US: IUD appears to be in appropriate position within the endometrial canal.    Prior Therapy/Previous treatment included: muscle relaxant  Social History:  lives with their family  Current exercise:running, yoga, dance, HIIT workouts   Occupation: Pt works from home.   Prior Level of Function: independent   Current Level of Function: pain limits intercourse, daily activities      Types of fluid intake: 3-4 large glasses of water and 0-1 cups of coffee  Diet: oatmeal or eggs with bread in the morning, sandwich for lunch, rice/pasta, protein and vegetables for dinner  Habitus:well developed, well nourished  Abuse/Neglect: No     Pts goals: To determine what is causing pain    OBJECTIVE     See EMR under MEDIA for written consent provided 1/4/2022  Chaperone: declined    ORTHO SCREEN  Posture in sitting: WNL  Posture in standing: WNL  Pelvic alignment: no sign of deviations noted in supine   SI Joint Palpation: Tenderness to the right SI joint palpation. Tenderness to the left SI joint palpation. - both very slight  Adductor Palpation: WNL    LUMBAR ROM - WNL    HIP STRENGTH: 5/5      Special Tests for Load Transfer Assessment Between the Trunk and Lower Extremities:      Single Leg Stance Test:    o Right: WNL   o Left: WNL     ABDOMINAL WALL ASSESSMENT  Palpation: tender and increased tension  at lower abdomen  Abdominal strength: Rectus abdominus: 4+/5     Transverse abdominus: 4+/5  Scarring: none noted   Pelvic Floor Muscle and Transverse Abdominus Synergy: Not fully assessed this session   Diastasis: absent      BREATHING MECHANICS ASSESSMENT   Thorax Assessment During Quiet Respiration: WNL excursion of ribcage and WNL excursion of abdominal wall  Thorax Assessment During Deep Respiration: WNL excursion of ribcage and Decreased excursion of abdominal wall     VAGINAL PELVIC FLOOR EXAM    EXTERNAL ASSESSMENT  Introitus: WNL  Skin condition: redness noted  Scarring: none   Sensation: WNL   Pain:  tenderness at 3-5:00, 7:00  Voluntary contraction: visible lift  Voluntary relaxation: visible drop  Involuntary contraction: reflex tightening  Bearing down: bulge  Perineal descent: absent      INTERNAL ASSESSMENT  Pain: tender areas noted as follows: bulbospongiosus, puborectalis/pubococcygeus (matt on L), L OI   Sensation: able to localized pressure appropriately   Vaginal vault: WNL   Muscle Bulk: WFL   Muscle Power: 3/5  Muscle Endurance: NP   # Reps To Fatigue: NP     Fast Contractions in 10 seconds: NP      Quality of contraction: WNL   Specificity: WNL   Coordination: WNL   Prolapse check:none            TREATMENT     Treatment Time In: 1620  Treatment Time Out: 1700  Total Treatment time (time-based codes) separate from Evaluation: 40 minutes      Neuromuscular Re-education to develop Coordination, Control and Down training for 12 minutes including: pelvic floor relaxation/bulging training, pelvic floor relaxation speed after performing a kegel, diaphragmatic breathing and diaphragmatic breathing with Kegel    Manual Therapy to develop extensibility and desensitization for 8 minutes including: trigger point/myofascial release of posterior layer 1 PFM     SIP + DB      Therapeutic Activity Patient participated in dynamic functional therapeutic activities to improve functional performance for 20 minutes. Including: Education as described below.     Patient Education provided:   general anatomy/physiology of urinary/ bowel  system, benefits of treatment, risks of treatment, and alternative methods of treatment were discussed with the pt. Additionally, anatomy/physiology of pelvic floor, posture/body mechanices, diaphragmatic breathing and behavior modifications were reviewed.     Home Exercises Provided:  Written Home Exercises Provided: yes.  Exercises were reviewed and Amanda was able to demonstrate them prior to the end of the session.    Amanda demonstrated good  understanding of the education provided.     See EMR under Patient Instructions for exercises provided 1/4/2022.    Assessment     Amanda is a 26 y.o. female referred to outpatient Physical Therapy with a medical diagnosis of dyspareunia. Pt presents with significant pelvic and abdominal pain, especially with sex and menstruation. Ortho exam revealed good trunk and hip mobility and strength. Slight tenderness noted at B SI joints. Abdominal assessment revealed increased tension and tenderness at lower abdomen as well as slight limitation in mobility and coordination with deep breathing. Pelvic assessment revealed significant tenderness and tension both externally and internally. Most noted at layers 1 and 3. Because of this, training on improved breathing mechanics and pelvic floor down training initiated today. Based on presentation, Amanda would benefit from continued education and training for improved abdominopelvic mobility and muscular balance.     Pt prognosis is Good.   Pt will benefit from skilled outpatient Physical Therapy to address the deficits stated above and in the chart below, provide pt/family education, and to maximize pt's level of independence.     Plan of care discussed with patient: Yes  Pt's  spiritual, cultural and educational needs considered and patient is agreeable to the plan of care and goals as stated below:       Anticipated Barriers for therapy: none    Medical Necessity is demonstrated by the following    History  Co-morbidities and personal factors that may impact the plan of care Co-morbidities:   None on file     Personal Factors:   no deficits     low   Examination  Body Structures and Functions, activity limitations and participation restrictions that may impact the plan of care Body Regions/Systems/Functions:  decreased phasic ability of the pelvic muscles, increased tension of the pelvic muscles and dysfunctional defecation     Activity Limitations:  initiating a BM, intercourse/vaginal exam/tampon use without pain and Pain with ADLs    Participation Restrictions:  social activities with friends/family, relationship with spouse/partner, ADL participation affected by pain, regularly having a comfortable BM, work duties and exercise restrictions due to pain     Activity limitations:   Learning and applying knowledge  no deficits    General Tasks and Commands  no deficits    Communication  no deficits    Mobility  no deficits    Self care  no deficits    Domestic Life  no deficits    Interactions/Relationships  intimate relationships    Life Areas  employment    Community and Social Life  community life  recreation and leisure       low   Clinical Presentation stable and uncomplicated low   Decision Making/ Complexity Score: low       Goals:  Short Term Goals: 5 weeks   Pt to demonstrate proper diaphragmatic breathing to help with calming the nervous system in order to decrease pain and to improve abdominal wall musculature extensibility.   Pt to demonstrate good body mechanics when performing ADLs such as lifting and bending to decrease strain to lumbopelvic structures and reduce risk of further injury.  Pt to report minimal pain with palpation of abdominal wall due to improvement in soft  tissue mobility from moderate to minimal restrictions.  Pt to report a decrease in pain to no more than 7 at it's worst with menstruation.    Pt will report minimal to no pain with single digit pelvic assessment and display relaxation demonstrating improving pelvic floor muscle relaxation and coordination.   Pt to report being able to have a BM without straining 70% of the time to demonstrate improving PF coordination.     Long Term Goals: 10 weeks   Pt to be discharged with home plan for carry over after discharge.   Pt to report being able to complete a full day at work without significant increase in pain to demonstrate a return towards prior level of function.  Pt to report being able to have a comfortable vaginal exam without significant increase in pelvic pain.  Pt to report a decrease in pain to no more than 2/10 at it's worst with vaginal intercourse.    Pt will be independent with use of dilation in order to progress towards self management and intercourse.  Pt to report being able to have a BM without straining 90% of the time to demonstrate improving PF coordination.     Plan     Plan of care Certification: 1/4/2022 to 4/4/2022.    Outpatient Physical Therapy 1 times weekly for 10 weeks to include the following interventions: therapeutic exercises, therapeutic activity, neuromuscular re-education, manual therapy, modalities PRN, patient/family education and self care/home management    Plan for next visit: review PFM and abdominal down training, MT as needed, edu on bowel habits     EVANGELINA PADILLA, PT  1/4/2022          I have seen the patient, reviewed the therapist's plan of care, and I agree with the plan of care.      I certify the need for these services furnished under this plan of treatment and while under my care.     ___________________ ________ Physician/Referring Practitioner            ___________________________ Date of Signature

## 2022-01-21 ENCOUNTER — CLINICAL SUPPORT (OUTPATIENT)
Dept: REHABILITATION | Facility: HOSPITAL | Age: 27
End: 2022-01-21
Payer: MEDICAID

## 2022-01-21 DIAGNOSIS — M62.89 PELVIC FLOOR TENSION: ICD-10-CM

## 2022-01-21 DIAGNOSIS — R27.8 COORDINATION IMPAIRMENT: ICD-10-CM

## 2022-01-21 PROCEDURE — 97530 THERAPEUTIC ACTIVITIES: CPT | Mod: PN

## 2022-01-21 PROCEDURE — 97140 MANUAL THERAPY 1/> REGIONS: CPT | Mod: PN

## 2022-01-21 PROCEDURE — 97112 NEUROMUSCULAR REEDUCATION: CPT | Mod: PN

## 2022-01-21 PROCEDURE — 97110 THERAPEUTIC EXERCISES: CPT | Mod: PN

## 2022-01-21 NOTE — PROGRESS NOTES
Pelvic Health Physical Therapy   Treatment Note     Name: Amanda Cazares  Clinic Number: [id number]    Therapy Diagnosis: No diagnosis found.  Physician: Rubi Ordaz CNM    Visit Date: 1/21/2022    Physician Orders: PT Eval and Treat - Pelvic Floor PT   Medical Diagnosis from Referral: N94.10 (ICD-10-CM) - Dyspareunia in female  Evaluation Date: 1/4/2022  Authorization Period Expiration: 1/1/2023  Plan of Care Expiration: 4/4/2022  Visit # / Visits authorized: 2/10    Cancelled Visits: 0  No Show Visits: 0    Time In: 1408  Time Out: 1503  Total Billable Time: 55 minutes    Precautions: Standard    Subjective     Pt reports: she is not currently experiencing any pain; however she is on her period, so over the last couple days she has experienced sporadic and abdominal pain. Has used stretching and breathing techniques to help as needed.     She was compliant with home exercise program.  Response to previous treatment: good   Functional change: ongoing     Pain: 0/10  Location: lower abdomen, low back, vaginal canal     Objective     Amanda received therapeutic exercises to develop  flexibility and posture for 10 minutes including:     Child's pose <> cobra pose 5x5 breaths ea   Cat/cow x10   Deep squat stretch at step, at wall x10 breaths ea       Amanda received the following manual therapy techniques: to develop extensibility and desensitization for 20 minutes including: trigger point/myofascial release of abdomen, PFM     SIP + DB    Externally @ transerse perineals, bulbospongiosus   Internally @ puborectalis/pubococcygeus (matt on L), L OI   MFR @ lower abdomen - gentle MFR, cross hand, deep visceral pressure    Amanda participated in neuromuscular re-education activities to develop Coordination, Control and Down training for 15 minutes including: pelvic floor relaxation/bulging training and diaphragmatic breathing    DB with stretching   Pelvic drops - sitting, supine    Amanda participated in dynamic  functional therapeutic activities to improve functional performance for 10  minutes, including:    Bowel habits - posture, DB, bulge vs strain       Home Exercises Provided and Patient Education Provided     Education provided:   - anatomy/physiology of pelvic floor, posture/body mechanices, diaphragmatic breathing and proper bearing down techniques, independent abdominal release  Discussed progression of plan of care with patient; educated pt in activity modification; reviewed HEP with pt. Pt demonstrated and verbalized understanding of all instruction and was provided with a handout of HEP (see Patient Instructions).    Written Home Exercises Provided: yes.  Exercises were reviewed and Amanda was able to demonstrate them prior to the end of the session.  Amanda demonstrated good  understanding of the education provided.     See EMR under Patient Instructions for exercises provided 1/21/2022.    Assessment     Because Amanda is currently menstruating, internal work avoided in today's session. Instead pelvic floor drops reviewed, with good performance demonstrated. Most emphasis placed on abdominal work this session, with significant relaxation of abdominal musculature demonstrated by conclusion of session. PT also educated Amanda on independent performance at home. Based on performance, PT to continue with manual work NV.     Amanda Is progressing well towards her goals.   Pt prognosis is Good.     Pt will continue to benefit from skilled outpatient physical therapy to address the deficits listed in the problem list box on initial evaluation, provide pt/family education and to maximize pt's level of independence in the home and community environment.     Pt's spiritual, cultural and educational needs considered and pt agreeable to plan of care and goals.     Anticipated barriers to physical therapy: None     Goals:   Short Term Goals: 5 weeks   Pt to demonstrate proper diaphragmatic breathing to help with calming  the nervous system in order to decrease pain and to improve abdominal wall musculature extensibility. -progressing  Pt to demonstrate good body mechanics when performing ADLs such as lifting and bending to decrease strain to lumbopelvic structures and reduce risk of further injury. -progressing  Pt to report minimal pain with palpation of abdominal wall due to improvement in soft tissue mobility from moderate to minimal restrictions. -progressing  Pt to report a decrease in pain to no more than 7 at it's worst with menstruation.   -progressing  Pt will report minimal to no pain with single digit pelvic assessment and display relaxation demonstrating improving pelvic floor muscle relaxation and coordination.  -progressing  Pt to report being able to have a BM without straining 70% of the time to demonstrate improving PF coordination.  -progressing     Long Term Goals: 10 weeks   Pt to be discharged with home plan for carry over after discharge.  -progressing  Pt to report being able to complete a full day at work without significant increase in pain to demonstrate a return towards prior level of function. -progressing  Pt to report being able to have a comfortable vaginal exam without significant increase in pelvic pain. -progressing  Pt to report a decrease in pain to no more than 2/10 at it's worst with vaginal intercourse.   -progressing  Pt will be independent with use of dilation in order to progress towards self management and intercourse. -progressing  Pt to report being able to have a BM without straining 90% of the time to demonstrate improving PF coordination.  -progressing    Plan     Plan of care Certification: 1/4/2022 to 4/4/2022.    Plan for next visit: review abdominal work, resume internal release work    EVANGELINA PADILLA, PT   01/21/2022

## 2023-01-09 LAB
HBV SURFACE AG SERPL QL IA: NEGATIVE
HBV SURFACE AG SERPL QL IA: NORMAL
HCT VFR BLD AUTO: 32.7 % (ref 36–46)
HCV AB SERPL QL IA: NEGATIVE
HGB BLD-MCNC: 11.3 G/DL (ref 12–16)
PLATELET # BLD AUTO: 242 K/UL (ref 150–399)
RUBELLA AB, IGG (OHS): 4.88 IU/ML

## 2023-04-13 LAB — TSH SERPL DL<=0.005 MIU/L-ACNC: 1.67 UIU/ML (ref 0.41–5.9)

## 2023-05-05 LAB — GLUCOSE, 1 HOUR: 76 MG/DL

## 2023-05-23 ENCOUNTER — INITIAL PRENATAL (OUTPATIENT)
Dept: OBSTETRICS AND GYNECOLOGY | Facility: CLINIC | Age: 28
End: 2023-05-23
Payer: MEDICAID

## 2023-05-23 VITALS
BODY MASS INDEX: 24.74 KG/M2 | DIASTOLIC BLOOD PRESSURE: 60 MMHG | WEIGHT: 146.38 LBS | SYSTOLIC BLOOD PRESSURE: 104 MMHG

## 2023-05-23 DIAGNOSIS — Z3A.29 29 WEEKS GESTATION OF PREGNANCY: ICD-10-CM

## 2023-05-23 DIAGNOSIS — Z23 NEED FOR VACCINATION AGAINST PERTUSSIS: Primary | ICD-10-CM

## 2023-05-23 DIAGNOSIS — N76.0 VAGINITIS IN PREGNANCY IN THIRD TRIMESTER: ICD-10-CM

## 2023-05-23 DIAGNOSIS — O09.33 INSUFFICIENT PRENATAL CARE IN THIRD TRIMESTER: ICD-10-CM

## 2023-05-23 DIAGNOSIS — N76.1 CHRONIC VAGINITIS: ICD-10-CM

## 2023-05-23 DIAGNOSIS — Z34.80 MULTIGRAVIDA, ANTEPARTUM: ICD-10-CM

## 2023-05-23 DIAGNOSIS — O23.593 VAGINITIS IN PREGNANCY IN THIRD TRIMESTER: ICD-10-CM

## 2023-05-23 PROBLEM — Z97.5 IUD (INTRAUTERINE DEVICE) IN PLACE: Status: RESOLVED | Noted: 2018-08-23 | Resolved: 2023-05-23

## 2023-05-23 PROCEDURE — 81514 NFCT DS BV&VAGINITIS DNA ALG: CPT | Performed by: ADVANCED PRACTICE MIDWIFE

## 2023-05-23 PROCEDURE — 99213 PR OFFICE/OUTPT VISIT, EST, LEVL III, 20-29 MIN: ICD-10-PCS | Mod: S$PBB,TH,, | Performed by: ADVANCED PRACTICE MIDWIFE

## 2023-05-23 PROCEDURE — 99999 PR PBB SHADOW E&M-EST. PATIENT-LVL II: CPT | Mod: PBBFAC,,, | Performed by: ADVANCED PRACTICE MIDWIFE

## 2023-05-23 PROCEDURE — 99213 OFFICE O/P EST LOW 20 MIN: CPT | Mod: S$PBB,TH,, | Performed by: ADVANCED PRACTICE MIDWIFE

## 2023-05-23 PROCEDURE — 99999 PR PBB SHADOW E&M-EST. PATIENT-LVL II: ICD-10-PCS | Mod: PBBFAC,,, | Performed by: ADVANCED PRACTICE MIDWIFE

## 2023-05-23 PROCEDURE — 99212 OFFICE O/P EST SF 10 MIN: CPT | Mod: PBBFAC,TH | Performed by: ADVANCED PRACTICE MIDWIFE

## 2023-05-23 NOTE — PROGRESS NOTES
Some round ligament pain. Had first baby with us. Will email records to office. Reports passing glucose and normal anatomy scan

## 2023-05-23 NOTE — PROGRESS NOTES
Chief Complaint   Patient presents with    Initial Prenatal Visit       27 y.o.,  at 29w0d per report.    Patient presents today for a transfer initial prenatal visit. She reports she has been receiving regular, routine prenatal care in [place], though reports she saw several different providers around the [place] as she has been traveling for several months. She reports that she has records but has not yet submitted to [facility]. She reports that she completed GDM screening but is uncertain whether anatomy scan was done.   Reports frequent vaginal infections this pregnancy - BV and yeast - was treated with terazol historically with no relief. She also reports episodes of dizzy spells that verge on syncope without syncopal episodes.   Complaints today: cottage cheese discharge, painful coitus, swelling and redness.    SOCIAL HISTORY: Denies emotional/mental/physical/sexual violence or abuse. Feels safe at home.         ROS  OBSTETRICS:   Contractions No   Bleeding No   Loss of fluid No   Fetal mvmnt:   present   Genital: vaginitis  GASTRO:   Nausea No   Vomiting No      OB History    Para Term  AB Living   2 1 1     1   SAB IAB Ectopic Multiple Live Births           1      # Outcome Date GA Lbr Zac/2nd Weight Sex Delivery Anes PTL Lv   2 Current            1 Term 18 40w5d 07:45 / 00:47 3.78 kg (8 lb 5.3 oz) M Vag-Spont None N LINDA       Dating reviewed  Allergies and problem list reviewed and updated  Medical and surgical history reviewed    PHYSICAL EXAM  /60   Wt 66.4 kg (146 lb 6.2 oz)   LMP 2022   BMI 24.74 kg/m²     GENERAL: No acute distress  HEENT: Normocephalic, atruamatic  NEURO: Alert and oriented x3  PSYCH: Calm, normal mood and affect  PULMONARY: Non-labored respiration; no tachypnea  ABD: Soft, gravid, nontender  GEN: flocculent white discharge noted; labia swollen, erythematous; non-specific discharge  ASSESSMENT AND PLAN     Problems (from 23 to present)      No problems associated with this episode.            Patient does have copy of prenatal records here with her today on her phone but will email for review...  Counseled today on staffing limitations of [facility]; midwifery call limitations (ie no CNM coverage between Friday am and Sunday am)    Review exercise precautions in pregnancy, along with recommendations. She does exercise regularly.   Discussed substances & foods to avoid in pregnancy (i.e. sushi, fish that are high in mercury, deli meat, and unpasteurized cheeses).   Discussed prenatal vitamin options (i.e. stool softener, DHA).    Education regarding CDC recommendations for TDAP in pregnancy, reviewed risks/benefits to this. Patient accepts and will schedule with next visit.  Patient was oriented to practice and progression of routine prenatal care.   Reviewed New OB Pregnancy packet & questions answered.    Reviewed aneuploidy screening options and indications, questions answered - patient is uncertain whether screening was completed.  Education regarding warning signs.      Follow up: 4 wks, call or present sooner prn.

## 2023-05-25 ENCOUNTER — PATIENT MESSAGE (OUTPATIENT)
Dept: OBSTETRICS AND GYNECOLOGY | Facility: CLINIC | Age: 28
End: 2023-05-25
Payer: MEDICAID

## 2023-05-25 DIAGNOSIS — Z34.80 MULTIGRAVIDA, ANTEPARTUM: ICD-10-CM

## 2023-05-25 DIAGNOSIS — N76.0 VAGINITIS IN PREGNANCY IN THIRD TRIMESTER: Primary | ICD-10-CM

## 2023-05-25 DIAGNOSIS — O23.593 VAGINITIS IN PREGNANCY IN THIRD TRIMESTER: Primary | ICD-10-CM

## 2023-05-30 ENCOUNTER — PATIENT MESSAGE (OUTPATIENT)
Dept: OTHER | Facility: OTHER | Age: 28
End: 2023-05-30
Payer: MEDICAID

## 2023-06-05 ENCOUNTER — PATIENT MESSAGE (OUTPATIENT)
Dept: OBSTETRICS AND GYNECOLOGY | Facility: CLINIC | Age: 28
End: 2023-06-05
Payer: MEDICAID

## 2023-06-05 PROBLEM — Z34.80 MULTIGRAVIDA, ANTEPARTUM: Status: ACTIVE | Noted: 2023-06-05

## 2023-06-06 ENCOUNTER — CLINICAL SUPPORT (OUTPATIENT)
Dept: OBSTETRICS AND GYNECOLOGY | Facility: CLINIC | Age: 28
End: 2023-06-06
Payer: MEDICAID

## 2023-06-06 ENCOUNTER — LAB VISIT (OUTPATIENT)
Dept: LAB | Facility: OTHER | Age: 28
End: 2023-06-06
Payer: MEDICAID

## 2023-06-06 ENCOUNTER — PROCEDURE VISIT (OUTPATIENT)
Dept: MATERNAL FETAL MEDICINE | Facility: CLINIC | Age: 28
End: 2023-06-06
Payer: MEDICAID

## 2023-06-06 ENCOUNTER — ROUTINE PRENATAL (OUTPATIENT)
Dept: OBSTETRICS AND GYNECOLOGY | Facility: CLINIC | Age: 28
End: 2023-06-06
Payer: MEDICAID

## 2023-06-06 VITALS
SYSTOLIC BLOOD PRESSURE: 102 MMHG | DIASTOLIC BLOOD PRESSURE: 58 MMHG | BODY MASS INDEX: 24.94 KG/M2 | WEIGHT: 147.63 LBS

## 2023-06-06 DIAGNOSIS — Z34.80 MULTIGRAVIDA, ANTEPARTUM: Primary | ICD-10-CM

## 2023-06-06 DIAGNOSIS — N76.0 VAGINITIS IN PREGNANCY IN THIRD TRIMESTER: ICD-10-CM

## 2023-06-06 DIAGNOSIS — Z3A.29 29 WEEKS GESTATION OF PREGNANCY: ICD-10-CM

## 2023-06-06 DIAGNOSIS — Z23 NEED FOR TDAP VACCINATION: Primary | ICD-10-CM

## 2023-06-06 DIAGNOSIS — O09.33 INSUFFICIENT PRENATAL CARE IN THIRD TRIMESTER: ICD-10-CM

## 2023-06-06 DIAGNOSIS — O23.593 VAGINITIS IN PREGNANCY IN THIRD TRIMESTER: ICD-10-CM

## 2023-06-06 DIAGNOSIS — Z34.80 MULTIGRAVIDA, ANTEPARTUM: ICD-10-CM

## 2023-06-06 LAB
ABO + RH BLD: NORMAL
BASOPHILS # BLD AUTO: 0.03 K/UL (ref 0–0.2)
BASOPHILS NFR BLD: 0.4 % (ref 0–1.9)
BLD GP AB SCN CELLS X3 SERPL QL: NORMAL
DIFFERENTIAL METHOD: ABNORMAL
EOSINOPHIL # BLD AUTO: 0.1 K/UL (ref 0–0.5)
EOSINOPHIL NFR BLD: 0.9 % (ref 0–8)
ERYTHROCYTE [DISTWIDTH] IN BLOOD BY AUTOMATED COUNT: 13.1 % (ref 11.5–14.5)
HCT VFR BLD AUTO: 36.1 % (ref 37–48.5)
HGB BLD-MCNC: 11.8 G/DL (ref 12–16)
HIV 1+2 AB+HIV1 P24 AG SERPL QL IA: NORMAL
IMM GRANULOCYTES # BLD AUTO: 0.01 K/UL (ref 0–0.04)
IMM GRANULOCYTES NFR BLD AUTO: 0.1 % (ref 0–0.5)
LYMPHOCYTES # BLD AUTO: 2.1 K/UL (ref 1–4.8)
LYMPHOCYTES NFR BLD: 31.3 % (ref 18–48)
MCH RBC QN AUTO: 30.6 PG (ref 27–31)
MCHC RBC AUTO-ENTMCNC: 32.7 G/DL (ref 32–36)
MCV RBC AUTO: 94 FL (ref 82–98)
MONOCYTES # BLD AUTO: 0.3 K/UL (ref 0.3–1)
MONOCYTES NFR BLD: 4.6 % (ref 4–15)
NEUTROPHILS # BLD AUTO: 4.2 K/UL (ref 1.8–7.7)
NEUTROPHILS NFR BLD: 62.7 % (ref 38–73)
NRBC BLD-RTO: 0 /100 WBC
PLATELET # BLD AUTO: 152 K/UL (ref 150–450)
PMV BLD AUTO: 11.8 FL (ref 9.2–12.9)
RBC # BLD AUTO: 3.86 M/UL (ref 4–5.4)
RPR SER QL: NORMAL
SPECIMEN OUTDATE: NORMAL
WBC # BLD AUTO: 6.74 K/UL (ref 3.9–12.7)

## 2023-06-06 PROCEDURE — 99213 PR OFFICE/OUTPT VISIT, EST, LEVL III, 20-29 MIN: ICD-10-PCS | Mod: TH,S$PBB,,

## 2023-06-06 PROCEDURE — 85025 COMPLETE CBC W/AUTO DIFF WBC: CPT

## 2023-06-06 PROCEDURE — 86850 RBC ANTIBODY SCREEN: CPT

## 2023-06-06 PROCEDURE — 90471 IMMUNIZATION ADMIN: CPT | Mod: PBBFAC

## 2023-06-06 PROCEDURE — 99212 OFFICE O/P EST SF 10 MIN: CPT | Mod: PBBFAC,TH

## 2023-06-06 PROCEDURE — 86762 RUBELLA ANTIBODY: CPT

## 2023-06-06 PROCEDURE — 99999 PR PBB SHADOW E&M-EST. PATIENT-LVL II: ICD-10-PCS | Mod: PBBFAC,,,

## 2023-06-06 PROCEDURE — 76805 OB US >/= 14 WKS SNGL FETUS: CPT | Mod: PBBFAC | Performed by: OBSTETRICS & GYNECOLOGY

## 2023-06-06 PROCEDURE — 90715 TDAP VACCINE 7 YRS/> IM: CPT | Mod: PBBFAC

## 2023-06-06 PROCEDURE — 86592 SYPHILIS TEST NON-TREP QUAL: CPT | Performed by: ADVANCED PRACTICE MIDWIFE

## 2023-06-06 PROCEDURE — 87389 HIV-1 AG W/HIV-1&-2 AB AG IA: CPT | Performed by: ADVANCED PRACTICE MIDWIFE

## 2023-06-06 PROCEDURE — 36415 COLL VENOUS BLD VENIPUNCTURE: CPT

## 2023-06-06 PROCEDURE — 99213 OFFICE O/P EST LOW 20 MIN: CPT | Mod: TH,S$PBB,,

## 2023-06-06 PROCEDURE — 76805 US MFM PROCEDURE (VIEWPOINT): ICD-10-PCS | Mod: 26,S$PBB,, | Performed by: OBSTETRICS & GYNECOLOGY

## 2023-06-06 PROCEDURE — 87591 N.GONORRHOEAE DNA AMP PROB: CPT

## 2023-06-06 PROCEDURE — 99999 PR PBB SHADOW E&M-EST. PATIENT-LVL II: CPT | Mod: PBBFAC,,,

## 2023-06-06 NOTE — PROGRESS NOTES
Chief Complaint   Patient presents with    Routine Prenatal Visit       27 y.o. female  at 31w0d, by Estimated Date of Delivery: 23    Complaints today: None. Doing well today. Occasional round ligament pain after exercise  Discussed concerns regarding being admitted last pregnancy. States she was told she wasn't in labor and couldn't be admitted, then progressed quickly and had baby very soon after admission. States she felt like she was not listened to or taken seriously.   Discussed that guidelines to admit to [facility] is active labor, but that we would be aware of quick first labor. Aware of need to evaluate for active labor in OB ED prior to admission. Reports understanding of process.   Reviewed TW lbs      ROS  OBSTETRICS:   Contractions No   Bleeding No   Loss of fluid No   Fetal mvmnt Yes  GASTRO:   Nausea No   Vomiting No      OB History    Para Term  AB Living   2 1 1     1   SAB IAB Ectopic Multiple Live Births           1      # Outcome Date GA Lbr Zac/2nd Weight Sex Delivery Anes PTL Lv   2 Current            1 Term 18 40w5d 07:45 / 00:47 3.78 kg (8 lb 5.3 oz) M Vag-Spont None N LINDA       Dating reviewed  Allergies and problem list reviewed and updated  Medical and surgical history reviewed  Prenatal labs reviewed and updated    PHYSICAL EXAM  BP (!) 102/58   Wt 67 kg (147 lb 9.6 oz)   LMP 2022   BMI 24.94 kg/m²     GENERAL: No acute distress  HEENT: Normocephalic, atraumatic  NEURO: Alert and oriented x3  PSYCH: Normal mood and affect  PULMONARY: Non-labored respiration; no tachypnea  ABD: Soft, gravid, nontender.      ASSESSMENT AND PLAN     Problems (from 23 to present)     Problem Noted Resolved    Multigravida, antepartum 2023 by Argelia Chapman CNM No          Patient does plans to breast feed - reviewed breast feeding class here.  It's a girl! They do not desire circumcision.  Pediatrician: Dr ROSARIO Moreau  Patient does request Breast  Pump Rx today.  US today after visit to complete anatomy/growth, d/t missing prenatal records.   Labs ordered that were missing in prenatal records.   TDAP ordered.    Reviewed warning signs, normal FM,  labor precautions, and how/when to call.  Confirmed pt has after-hours number.    Follow-up: 2 weeks, call or present sooner NAVEED Post CNM

## 2023-06-07 LAB
C TRACH DNA SPEC QL NAA+PROBE: NOT DETECTED
N GONORRHOEA DNA SPEC QL NAA+PROBE: NOT DETECTED
RUBV IGG SER-ACNC: 45.6 IU/ML
RUBV IGG SER-IMP: REACTIVE

## 2023-06-13 ENCOUNTER — PATIENT MESSAGE (OUTPATIENT)
Dept: OTHER | Facility: OTHER | Age: 28
End: 2023-06-13
Payer: MEDICAID

## 2023-06-21 ENCOUNTER — ROUTINE PRENATAL (OUTPATIENT)
Dept: OBSTETRICS AND GYNECOLOGY | Facility: CLINIC | Age: 28
End: 2023-06-21
Payer: MEDICAID

## 2023-06-21 VITALS — BODY MASS INDEX: 25.5 KG/M2 | SYSTOLIC BLOOD PRESSURE: 106 MMHG | WEIGHT: 150.88 LBS | DIASTOLIC BLOOD PRESSURE: 62 MMHG

## 2023-06-21 DIAGNOSIS — Z3A.33 33 WEEKS GESTATION OF PREGNANCY: ICD-10-CM

## 2023-06-21 DIAGNOSIS — O26.893 PELVIC PAIN IN PREGNANCY, ANTEPARTUM, THIRD TRIMESTER: Primary | ICD-10-CM

## 2023-06-21 DIAGNOSIS — R10.2 PELVIC PAIN IN PREGNANCY, ANTEPARTUM, THIRD TRIMESTER: Primary | ICD-10-CM

## 2023-06-21 PROCEDURE — 99213 PR OFFICE/OUTPT VISIT, EST, LEVL III, 20-29 MIN: ICD-10-PCS | Mod: S$PBB,TH,, | Performed by: ADVANCED PRACTICE MIDWIFE

## 2023-06-21 PROCEDURE — 99212 OFFICE O/P EST SF 10 MIN: CPT | Mod: PBBFAC,TH | Performed by: ADVANCED PRACTICE MIDWIFE

## 2023-06-21 PROCEDURE — 99213 OFFICE O/P EST LOW 20 MIN: CPT | Mod: S$PBB,TH,, | Performed by: ADVANCED PRACTICE MIDWIFE

## 2023-06-21 PROCEDURE — 99999 PR PBB SHADOW E&M-EST. PATIENT-LVL II: ICD-10-PCS | Mod: PBBFAC,,, | Performed by: ADVANCED PRACTICE MIDWIFE

## 2023-06-21 PROCEDURE — 99999 PR PBB SHADOW E&M-EST. PATIENT-LVL II: CPT | Mod: PBBFAC,,, | Performed by: ADVANCED PRACTICE MIDWIFE

## 2023-06-21 NOTE — PROGRESS NOTES
27 y.o. female  at 33w1d, by Estimated Date of Delivery: 23    Complaints today: some pelvic pressure.   Reviewed TW lbs    ROS  OBSTETRICS:   Contractions No   Bleeding No   Loss of fluid No   Fetal mvmnt Present  GASTRO:   Nausea No   Vomiting No      OB History    Para Term  AB Living   2 1 1     1   SAB IAB Ectopic Multiple Live Births           1      # Outcome Date GA Lbr Zac/2nd Weight Sex Delivery Anes PTL Lv   2 Current            1 Term 18 40w5d 07:45 / 00:47 3.78 kg (8 lb 5.3 oz) M Vag-Spont None N LINDA       Dating reviewed  Allergies and problem list reviewed and updated  Medical and surgical history reviewed  Prenatal labs reviewed and updated    PHYSICAL EXAM  /62   Wt 68.5 kg (150 lb 14.5 oz)   LMP 2022   BMI 25.50 kg/m²     GENERAL: No acute distress  HEENT: Normocephalic, atraumatic  NEURO: Alert and oriented x3  PSYCH: Normal mood and affect  PULMONARY: Non-labored respiration; no tachypnea  ABD: Soft, gravid, nontender.      ASSESSMENT AND PLAN     Problems (from 23 to present)     Problem Noted Resolved    Multigravida, antepartum 2023 by Argelia Chapman CNM No    Overview Addendum 2023 10:35 AM by Alexia Post CNM     Connected Mom:   Prepregnancy BMI:  21  Pap: unknown. Pap pp  Dating - by 1st trimester US  U/S - reports complete, getting 31wk growth  Aneuploidy screening - declined  Blood type: O POS. Rhogam: Date:  GDM screen - passed 1hr   Vaccines -   [ ]Flu  [x]Covid-19 - x1, no boosters  [x ]TDAP  Contraception - IUD  Peds - Dr Martinez  Circ - n/a  GBS  [ ]Consents                 Reviewed upcoming 36wk labs .   Reviewed patient does not have a history of genital HSV.     Reviewed ABC risk assessment with the patient:    Birth Center Risk Assessment: 0- Meets birth center guidelines    0- CNM management in ABC  1- CNM management on L&D  2- Consultation with OB to develop  plan of care  3- Collaborative CNM/OB  management with delivery on L&D  4- Permanent referral of care to MD    Patient understands is currently  a candidate for the ABC. Reviewed potential risks which could arise, that could change this status.  Pelvic floor PT referral  Reviewed warning signs, normal FM,  labor precautions, and how/when to call.  Confirmed pt has after-hours number.    Follow-up: 2 weeks, call or present sooner PRN

## 2023-06-22 ENCOUNTER — CLINICAL SUPPORT (OUTPATIENT)
Dept: REHABILITATION | Facility: HOSPITAL | Age: 28
End: 2023-06-22
Payer: MEDICAID

## 2023-06-22 DIAGNOSIS — N81.89 PELVIC FLOOR WEAKNESS: Primary | ICD-10-CM

## 2023-06-22 DIAGNOSIS — R27.9 LACK OF COORDINATION: ICD-10-CM

## 2023-06-22 DIAGNOSIS — R10.2 PELVIC PAIN IN PREGNANCY, ANTEPARTUM, THIRD TRIMESTER: ICD-10-CM

## 2023-06-22 DIAGNOSIS — O26.893 PELVIC PAIN IN PREGNANCY, ANTEPARTUM, THIRD TRIMESTER: ICD-10-CM

## 2023-06-22 PROCEDURE — 97112 NEUROMUSCULAR REEDUCATION: CPT | Mod: PN

## 2023-06-22 PROCEDURE — 97162 PT EVAL MOD COMPLEX 30 MIN: CPT | Mod: PN

## 2023-06-22 NOTE — PROGRESS NOTES
[facility]   Pelvic Health Physical Therapy Initial Evaluation     Date: 2023   Name: Amanda Cazares  Clinic Number: [id number]    Therapy Diagnosis:   Encounter Diagnoses   Name Primary?    Pelvic pain in pregnancy, antepartum, third trimester     Pelvic floor weakness Yes    Lack of coordination      Physician: Argelia Chapman CNM    Physician Orders: PT Eval and Treat   Medical Diagnosis from Referral: pelvic pain in pregnancy, antepartum, third trimester  Evaluation Date: 2023  Authorization Period Expiration: 2024  Plan of Care Expiration: 2023  Progress Note Due: 2023  Visit # / Visits authorized:    FOTO: Issued Visit #: 0     Precautions: Standard    Time In: 10:05  Time Out: 10:55  Total Appointment Time (timed & untimed codes): 50 minutes    SUBJECTIVE     Date of onset: 1-2 months ago    History of current condition - Amanda reports: has been having lower abdominal pain starting a few months ago. Is having a hard time completing exercise and workouts and will need to lay down for a few hours. Is also having difficulty with prolonged walking. Describes pain as swollen.  Is currently 33w pregnant. Also reports lower back pain that started with the abdominal pain. Reports lower leg cramping.     OB/GYN History:  .  4 year old. No major events.   Sexually active? Yes  Pain with vaginal exams, intercourse or tampon use? Yes, reports the lower abdominal pain.     Bladder/Bowel History:   Frequency of urination:   Daytime: 1-2 times every hour           Nighttime: 1-2 times per night  Difficulty initiating urine stream: No  Urine stream: strong  Complete emptying: Yes, but sometimes feels like she has the urge and will have very little output.   Bladder leakage: Yes with walking  Frequency of incidents: has only happened 2-3 times  Amount leaked (urine): few drops  Urinary Urgency: Yes, Able to delay the urge for at least 5-10 minute(s).  Frequency  "of bowel movements: once a day  Difficulty initiating BM: No  Quality/Shape of BM:  soft solid  Does Patient Feel Empty after BM? Yes  Fiber Supplements or Laxative Use? No  Colon leakage: No  Frequency of incidents: none   Amount leaked (bowels):  none  Form of protection: none  Number of pads required in 24 hours: 0    Pain:  Location: low abdomen, PS, and low back  Current 2/10, worst 9/10, best 2/10   Description: Aching, Sharp, and Shooting  Aggravating Factors/Activities that cause symptoms: Prolonged standing, Walking, Exercise, and Lying down for a prolonged time  Easing Factors: massage, relaxation, and stretching, and "cracking joints", heat, applying pressure    Prior Therapy: came 2-3 years ago for Pelvic Physical Therapy   Social History:  lives with their family  Current exercise: not currently   Occupation: Patient works from home.   Prior Level of Function: independent  Current Level of Function: independent    Types of fluid intake: glass of lemon water, green tea, coffee, water 3-4 bottles  Diet: no major restrictions, is eating a whole diet   Habitus: well developed, well nourished  Abuse/Neglect: No     Patients goals: "having guidance on how to manage pain and hopefully return to exercise"      Medical History: Amanda  has no past medical history on file.     Surgical History: Amanda Cazares  has a past surgical history that includes Nose surgery.    Medications: Amanda has a current medication list which includes the following prescription(s): epinephrine, famotidine, and prednisone.    Allergies:   Review of patient's allergies indicates:   Allergen Reactions    Flagyl [metronidazole] Anaphylaxis    Shellfish containing products         OBJECTIVE     See EMR under MEDIA for written consent provided 6/22/2023  Chaperone: declined    ORTHO SCREEN  Posture in sitting: WNL  Posture in standing: increased lordosis  Pelvic alignment:  left leg appears shorter in supine    SI Joint Palpation: Denies " tenderness to SI joint palpation bilaterally.  Sacral spring test: NT  Adductor Palpation: increased tension      ABDOMINAL WALL ASSESSMENT  Palpation:  NT  Abdominal strength: Rectus abdominus: 3/5     Transverse abdominus: 3/5  Scarring: n/a  Pelvic Floor Muscle and Transverse Abdominus Synergy: absent  Diastasis: absent      BREATHING MECHANICS ASSESSMENT   Thorax Assessment During Quiet Respiration: WNL excursion of ribcage  Thorax Assessment During Deep Respiration: Decreased excursion of abdominal wall , Excessive excursion of sternum, and Excessive excursion of lower ribs    Comments: Innominate rotation, right longer, had pain after adjustment. Improved pain with SI belt application. Moderate performance of transverse abdominis contraction, improved with cues to contract low abdomen. Reported PS and low back pain with transverse abdominis contraction in sitting, improved with submax contraction.      Limitation/Restriction for FOTO Survey    Therapist reviewed FOTO scores for Amanda Cazares on 6/22/2023.   FOTO documents entered into EPIC - see Media section.    Limitation Score: will be completed at a later date       TREATMENT     Total Treatment time (time-based codes) separate from Evaluation: 30 minutes     Neuromuscular Re-education to improve Coordination, Control, and Down training for 30 minutes including:   pelvic floor relaxation/bulging training, adbominal bracing, and diaphragmatic breathing      PATIENT EDUCATION AND HOME EXERCISES     Education provided:   general anatomy/physiology of urinary/ bowel  system and benefits of treatment were discussed with the patient. Additionally, anatomy/physiology of pelvic floor, diaphragmatic breathing, isometric abdominal exercises, kegels, and behavior modifications were reviewed.     Written Home Exercises provided: yes.  Exercises were reviewed and Amanda was able to demonstrate them prior to the end of the session. Amanda demonstrated good   understanding of the education provided. See EMR under Patient Instructions for exercises provided during therapy sessions.    ASSESSMENT     Amanda is a 27 y.o. female referred to outpatient Physical Therapy with a medical diagnosis of pelvic pain in pregnancy, antepartum, third trimester. Pt presents with decreased pelvic floor strength and impaired coordination.     Patient prognosis is Good.   Patient will benefit from skilled outpatient Physical Therapy to address the deficits stated above and in the chart below, provide patient/family education, and to maximize patient's level of independence.     Plan of care discussed with patient: Yes  Patient's spiritual, cultural and educational needs considered and patient is agreeable to the plan of care and goals as stated below:     Anticipated Barriers for therapy: scheduling    Medical Necessity is demonstrated by the following:  History  Co-morbidities and personal factors that may impact the plan of care Co-morbidities   pregnancy    Personal Factors  no deficits     moderate   Examination  Body structures and functions, activity limitations and participation restrictions that may impact the plan of care Body Regions/Systems/Functions:  pelvic asymmetry, decreased pelvic muscle strength, decreased endurance of the pelvic muscles, decreased phasic ability of the pelvic muscles, poor quality of pelvic muscle contraction, poor coordination of pelvic floor muscles during ADL's leading to urinary or fecal leakage, and unable to co-contract or co-relax abdominal wall and pelvic floor muscles     Activity Limitations:  delaying urge to urinate, full bladder emptying, and Pain with ADLs    Participation Restrictions:  ADL participation affected by pain    Activity limitations:   Learning and applying knowledge  no deficits    General Tasks and Commands  no deficits    Communication  no deficits    Mobility  no deficits    Self care  no deficits    Domestic Life  no  deficits    Interactions/Relationships  no deficits    Life Areas  no deficits    Community and Social Life  no deficits       moderate   Clinical Presentation evolving clinical presentation with changing clinical characteristics moderate   Decision Making/ Complexity Score: moderate       Goals:  Short Term Goals: 6 weeks   - Pt will demonstrate excellent knowledge and adherence to HEP to facilitate optimal recovery.  - Pt will demonstrate proper PFM contraction, relaxation, and lengthening coordinated with TA and breath for improved muscle coordination needed for functional activity.  - Patient will report 40% decrease in pelvic pain with ADLs.     Long Term Goals: 12 weeks   - Pt will demonstrate excellent knowledge and adherence to HEP for continued self-maintenance of symptoms.  - Pt will report no incidence of urinary incontinence 7/7 days for improved hygiene and ADL/IADL tolerance.   - Pt will demonstrate PFM strength of at least 3/5 MMT for improved strength needed to maintain continence.   - Pt will report bearing down appropriately 100% of the time for improved bowel function and decreased stress on adjacent pelvic structures.   - Pt will be able to successfully complete exercise workouts for 7/7 days without pain for improved ADL tolerance.         PLAN     Plan of care Certification: 6/22/2023 to 9/22/2023.    Outpatient Physical Therapy 1-2 time(s) every 1 week(s) for 8-12 weeks to include the following interventions: Manual Therapy, Moist Heat/ Ice, Neuromuscular Re-ed, Patient Education, Therapeutic Activities, and Therapeutic Exercise.     Winifred Winslow, PT      I CERTIFY THE NEED FOR THESE SERVICES FURNISHED UNDER THIS PLAN OF TREATMENT AND WHILE UNDER MY CARE   Physician's comments:     Physician's Signature: ___________________________________________________

## 2023-06-23 PROBLEM — N81.89 PELVIC FLOOR WEAKNESS: Status: ACTIVE | Noted: 2023-06-23

## 2023-06-23 PROBLEM — R27.9 LACK OF COORDINATION: Status: ACTIVE | Noted: 2023-06-23

## 2023-06-23 PROBLEM — O26.893 PELVIC PAIN IN PREGNANCY, ANTEPARTUM, THIRD TRIMESTER: Status: ACTIVE | Noted: 2023-06-23

## 2023-06-23 PROBLEM — R10.2 PELVIC PAIN IN PREGNANCY, ANTEPARTUM, THIRD TRIMESTER: Status: ACTIVE | Noted: 2023-06-23

## 2023-06-23 NOTE — PATIENT INSTRUCTIONS
Leana Patel,   It was great meeting you. Here is what we went over during your first visit.    Try to get a SI belt to wear over your pelvis to help improve stability.   The pelvic alignment exercise to get your pelvic more neutral. (Picture is attached below.)  The core bracing to improve strength and decrease movement at that front joint. (Picture is attached below.)    Let me know if you have any questions.

## 2023-06-23 NOTE — PLAN OF CARE
[facility]   Pelvic Health Physical Therapy Initial Evaluation     Date: 2023   Name: Amanda Cazares  Clinic Number: [id number]    Therapy Diagnosis:   Encounter Diagnoses   Name Primary?    Pelvic pain in pregnancy, antepartum, third trimester     Pelvic floor weakness Yes    Lack of coordination      Physician: Argelia Chapman CNM    Physician Orders: PT Eval and Treat   Medical Diagnosis from Referral: pelvic pain in pregnancy, antepartum, third trimester  Evaluation Date: 2023  Authorization Period Expiration: 2024  Plan of Care Expiration: 2023  Progress Note Due: 2023  Visit # / Visits authorized:    FOTO: Issued Visit #: 0     Precautions: Standard    Time In: 10:05  Time Out: 10:55  Total Appointment Time (timed & untimed codes): 50 minutes    SUBJECTIVE     Date of onset: 1-2 months ago    History of current condition - Amanda reports: has been having lower abdominal pain starting a few months ago. Is having a hard time completing exercise and workouts and will need to lay down for a few hours. Is also having difficulty with prolonged walking. Describes pain as swollen.  Is currently 33w pregnant. Also reports lower back pain that started with the abdominal pain. Reports lower leg cramping.     OB/GYN History: .  4 year old. No major events.   Sexually active? Yes  Pain with vaginal exams, intercourse or tampon use? Yes, reports the lower abdominal pain.     Bladder/Bowel History:   Frequency of urination:   Daytime: 1-2 times every hour           Nighttime: 1-2 times per night  Difficulty initiating urine stream: No  Urine stream: strong  Complete emptying: Yes, but sometimes feels like she has the urge and will have very little output.   Bladder leakage: Yes with walking  Frequency of incidents: has only happened 2-3 times  Amount leaked (urine): few drops  Urinary Urgency: Yes, Able to delay the urge for at least 5-10 minute(s).  Frequency of  "bowel movements: once a day  Difficulty initiating BM: No  Quality/Shape of BM: soft solid  Does Patient Feel Empty after BM? Yes  Fiber Supplements or Laxative Use? No  Colon leakage: No  Frequency of incidents: none   Amount leaked (bowels): none  Form of protection: none  Number of pads required in 24 hours: 0    Pain:  Location: low abdomen, PS, and low back  Current 2/10, worst 9/10, best 2/10   Description: Aching, Sharp, and Shooting  Aggravating Factors/Activities that cause symptoms: Prolonged standing, Walking, Exercise, and Lying down for a prolonged time  Easing Factors: massage, relaxation, and stretching, and "cracking joints", heat, applying pressure    Prior Therapy: came 2-3 years ago for Pelvic Physical Therapy   Social History:  lives with their family  Current exercise: not currently   Occupation: Patient works from home.   Prior Level of Function: independent  Current Level of Function: independent    Types of fluid intake: glass of lemon water, green tea, coffee, water 3-4 bottles  Diet: no major restrictions, is eating a whole diet   Habitus: well developed, well nourished  Abuse/Neglect: No     Patients goals: "having guidance on how to manage pain and hopefully return to exercise"      Medical History: Amanda  has no past medical history on file.     Surgical History: Amanda Cazares  has a past surgical history that includes Nose surgery.    Medications: Amanda has a current medication list which includes the following prescription(s): epinephrine, famotidine, and prednisone.    Allergies:   Review of patient's allergies indicates:   Allergen Reactions    Flagyl [metronidazole] Anaphylaxis    Shellfish containing products         OBJECTIVE     See EMR under MEDIA for written consent provided 6/22/2023  Chaperone: declined    ORTHO SCREEN  Posture in sitting: WNL  Posture in standing: increased lordosis  Pelvic alignment: left leg appears shorter in supine    SI Joint Palpation: Denies " tenderness to SI joint palpation bilaterally.  Sacral spring test: NT  Adductor Palpation: increased tension      ABDOMINAL WALL ASSESSMENT  Palpation: NT  Abdominal strength: Rectus abdominus: 3/5     Transverse abdominus: 3/5  Scarring: n/a  Pelvic Floor Muscle and Transverse Abdominus Synergy: absent  Diastasis: absent      BREATHING MECHANICS ASSESSMENT   Thorax Assessment During Quiet Respiration: WNL excursion of ribcage  Thorax Assessment During Deep Respiration: Decreased excursion of abdominal wall , Excessive excursion of sternum, and Excessive excursion of lower ribs    Comments: Innominate rotation, right longer, had pain after adjustment. Improved pain with SI belt application. Moderate performance of transverse abdominis contraction, improved with cues to contract low abdomen. Reported PS and low back pain with transverse abdominis contraction in sitting, improved with submax contraction.      Limitation/Restriction for FOTO Survey    Therapist reviewed FOTO scores for Amanda Cazares on 6/22/2023.   FOTO documents entered into EPIC - see Media section.    Limitation Score: will be completed at a later date       TREATMENT     Total Treatment time (time-based codes) separate from Evaluation: 30 minutes     Neuromuscular Re-education to improve Coordination, Control, and Down training for 30 minutes including:   pelvic floor relaxation/bulging training, adbominal bracing, and diaphragmatic breathing      PATIENT EDUCATION AND HOME EXERCISES     Education provided:   general anatomy/physiology of urinary/ bowel  system and benefits of treatment were discussed with the patient. Additionally, anatomy/physiology of pelvic floor, diaphragmatic breathing, isometric abdominal exercises, kegels, and behavior modifications were reviewed.     Written Home Exercises provided: yes.  Exercises were reviewed and Amanda was able to demonstrate them prior to the end of the session. Amanda demonstrated good   understanding of the education provided. See EMR under Patient Instructions for exercises provided during therapy sessions.    ASSESSMENT     Amanda is a 27 y.o. female referred to outpatient Physical Therapy with a medical diagnosis of pelvic pain in pregnancy, antepartum, third trimester. Pt presents with decreased pelvic floor strength and impaired coordination.     Patient prognosis is Good.   Patient will benefit from skilled outpatient Physical Therapy to address the deficits stated above and in the chart below, provide patient/family education, and to maximize patient's level of independence.     Plan of care discussed with patient: Yes  Patient's spiritual, cultural and educational needs considered and patient is agreeable to the plan of care and goals as stated below:     Anticipated Barriers for therapy: scheduling    Medical Necessity is demonstrated by the following:  History  Co-morbidities and personal factors that may impact the plan of care Co-morbidities   pregnancy    Personal Factors  no deficits     moderate   Examination  Body structures and functions, activity limitations and participation restrictions that may impact the plan of care Body Regions/Systems/Functions:  pelvic asymmetry, decreased pelvic muscle strength, decreased endurance of the pelvic muscles, decreased phasic ability of the pelvic muscles, poor quality of pelvic muscle contraction, poor coordination of pelvic floor muscles during ADL's leading to urinary or fecal leakage, and unable to co-contract or co-relax abdominal wall and pelvic floor muscles     Activity Limitations:  delaying urge to urinate, full bladder emptying, and Pain with ADLs    Participation Restrictions:  ADL participation affected by pain    Activity limitations:   Learning and applying knowledge  no deficits    General Tasks and Commands  no deficits    Communication  no deficits    Mobility  no deficits    Self care  no deficits    Domestic Life  no  deficits    Interactions/Relationships  no deficits    Life Areas  no deficits    Community and Social Life  no deficits       moderate   Clinical Presentation evolving clinical presentation with changing clinical characteristics moderate   Decision Making/ Complexity Score: moderate       Goals:  Short Term Goals: 6 weeks   - Pt will demonstrate excellent knowledge and adherence to HEP to facilitate optimal recovery.  - Pt will demonstrate proper PFM contraction, relaxation, and lengthening coordinated with TA and breath for improved muscle coordination needed for functional activity.  - Patient will report 40% decrease in pelvic pain with ADLs.     Long Term Goals: 12 weeks   - Pt will demonstrate excellent knowledge and adherence to HEP for continued self-maintenance of symptoms.  - Pt will report no incidence of urinary incontinence 7/7 days for improved hygiene and ADL/IADL tolerance.   - Pt will demonstrate PFM strength of at least 3/5 MMT for improved strength needed to maintain continence.   - Pt will report bearing down appropriately 100% of the time for improved bowel function and decreased stress on adjacent pelvic structures.   - Pt will be able to successfully complete exercise workouts for 7/7 days without pain for improved ADL tolerance.         PLAN     Plan of care Certification: 6/22/2023 to 9/22/2023.    Outpatient Physical Therapy 1-2 time(s) every 1 week(s) for 8-12 weeks to include the following interventions: Manual Therapy, Moist Heat/ Ice, Neuromuscular Re-ed, Patient Education, Therapeutic Activities, and Therapeutic Exercise.     Winifred Winslow, PT      I CERTIFY THE NEED FOR THESE SERVICES FURNISHED UNDER THIS PLAN OF TREATMENT AND WHILE UNDER MY CARE   Physician's comments:     Physician's Signature: ___________________________________________________

## 2023-06-27 ENCOUNTER — TELEPHONE (OUTPATIENT)
Dept: OBSTETRICS AND GYNECOLOGY | Facility: CLINIC | Age: 28
End: 2023-06-27
Payer: MEDICAID

## 2023-06-27 ENCOUNTER — HOSPITAL ENCOUNTER (EMERGENCY)
Facility: OTHER | Age: 28
Discharge: HOME | End: 2023-06-27
Attending: OBSTETRICS & GYNECOLOGY
Payer: MEDICAID

## 2023-06-27 VITALS
DIASTOLIC BLOOD PRESSURE: 70 MMHG | TEMPERATURE: 98 F | SYSTOLIC BLOOD PRESSURE: 102 MMHG | OXYGEN SATURATION: 95 % | HEART RATE: 80 BPM

## 2023-06-27 DIAGNOSIS — Z3A.34 34 WEEKS GESTATION OF PREGNANCY: ICD-10-CM

## 2023-06-27 DIAGNOSIS — R42 DIZZINESS: Primary | ICD-10-CM

## 2023-06-27 LAB
ALBUMIN SERPL BCP-MCNC: 3.3 G/DL (ref 3.5–5.2)
ALP SERPL-CCNC: 80 U/L (ref 55–135)
ALT SERPL W/O P-5'-P-CCNC: 11 U/L (ref 10–44)
ANION GAP SERPL CALC-SCNC: 9 MMOL/L (ref 8–16)
AST SERPL-CCNC: 17 U/L (ref 10–40)
BASOPHILS # BLD AUTO: 0.02 K/UL (ref 0–0.2)
BASOPHILS NFR BLD: 0.3 % (ref 0–1.9)
BILIRUB SERPL-MCNC: 0.5 MG/DL (ref 0.1–1)
BILIRUB SERPL-MCNC: NEGATIVE MG/DL
BLOOD URINE, POC: NEGATIVE
BUN SERPL-MCNC: 7 MG/DL (ref 6–20)
CALCIUM SERPL-MCNC: 9 MG/DL (ref 8.7–10.5)
CHLORIDE SERPL-SCNC: 107 MMOL/L (ref 95–110)
CO2 SERPL-SCNC: 21 MMOL/L (ref 23–29)
COLOR, POC UA: NORMAL
CREAT SERPL-MCNC: 0.7 MG/DL (ref 0.5–1.4)
DIFFERENTIAL METHOD: ABNORMAL
EOSINOPHIL # BLD AUTO: 0 K/UL (ref 0–0.5)
EOSINOPHIL NFR BLD: 0.5 % (ref 0–8)
ERYTHROCYTE [DISTWIDTH] IN BLOOD BY AUTOMATED COUNT: 13.4 % (ref 11.5–14.5)
EST. GFR  (NO RACE VARIABLE): >60 ML/MIN/1.73 M^2
GLUCOSE SERPL-MCNC: 77 MG/DL (ref 70–110)
GLUCOSE UR QL STRIP: NORMAL
HCT VFR BLD AUTO: 33.8 % (ref 37–48.5)
HGB BLD-MCNC: 11.5 G/DL (ref 12–16)
IMM GRANULOCYTES # BLD AUTO: 0.01 K/UL (ref 0–0.04)
IMM GRANULOCYTES NFR BLD AUTO: 0.2 % (ref 0–0.5)
KETONES UR QL STRIP: NEGATIVE
LEUKOCYTE ESTERASE URINE, POC: NORMAL
LYMPHOCYTES # BLD AUTO: 1.9 K/UL (ref 1–4.8)
LYMPHOCYTES NFR BLD: 28.5 % (ref 18–48)
MAGNESIUM SERPL-MCNC: 1.8 MG/DL (ref 1.6–2.6)
MCH RBC QN AUTO: 30.8 PG (ref 27–31)
MCHC RBC AUTO-ENTMCNC: 34 G/DL (ref 32–36)
MCV RBC AUTO: 91 FL (ref 82–98)
MONOCYTES # BLD AUTO: 0.3 K/UL (ref 0.3–1)
MONOCYTES NFR BLD: 4.9 % (ref 4–15)
NEUTROPHILS # BLD AUTO: 4.3 K/UL (ref 1.8–7.7)
NEUTROPHILS NFR BLD: 65.6 % (ref 38–73)
NITRITE, POC UA: NEGATIVE
NRBC BLD-RTO: 0 /100 WBC
PH, POC UA: 7
PHOSPHATE SERPL-MCNC: 3.9 MG/DL (ref 2.7–4.5)
PLATELET # BLD AUTO: 149 K/UL (ref 150–450)
PMV BLD AUTO: 11.3 FL (ref 9.2–12.9)
POCT GLUCOSE: 75 MG/DL (ref 70–110)
POTASSIUM SERPL-SCNC: 3.8 MMOL/L (ref 3.5–5.1)
PROT SERPL-MCNC: 6.6 G/DL (ref 6–8.4)
PROTEIN, POC: NORMAL
RBC # BLD AUTO: 3.73 M/UL (ref 4–5.4)
SODIUM SERPL-SCNC: 137 MMOL/L (ref 136–145)
SPECIFIC GRAVITY, POC UA: 1
UROBILINOGEN, POC UA: NORMAL
WBC # BLD AUTO: 6.5 K/UL (ref 3.9–12.7)

## 2023-06-27 PROCEDURE — 59025 PR FETAL 2N-STRESS TEST: ICD-10-PCS | Mod: 26,,, | Performed by: OBSTETRICS & GYNECOLOGY

## 2023-06-27 PROCEDURE — 59025 FETAL NON-STRESS TEST: CPT | Mod: 26,,, | Performed by: OBSTETRICS & GYNECOLOGY

## 2023-06-27 PROCEDURE — 99284 PR EMERGENCY DEPT VISIT,LEVEL IV: ICD-10-PCS | Mod: 25,,, | Performed by: OBSTETRICS & GYNECOLOGY

## 2023-06-27 PROCEDURE — 84100 ASSAY OF PHOSPHORUS: CPT | Performed by: STUDENT IN AN ORGANIZED HEALTH CARE EDUCATION/TRAINING PROGRAM

## 2023-06-27 PROCEDURE — 99284 EMERGENCY DEPT VISIT MOD MDM: CPT | Mod: 25,,, | Performed by: OBSTETRICS & GYNECOLOGY

## 2023-06-27 PROCEDURE — 85025 COMPLETE CBC W/AUTO DIFF WBC: CPT | Performed by: STUDENT IN AN ORGANIZED HEALTH CARE EDUCATION/TRAINING PROGRAM

## 2023-06-27 PROCEDURE — 82962 GLUCOSE BLOOD TEST: CPT

## 2023-06-27 PROCEDURE — 99284 EMERGENCY DEPT VISIT MOD MDM: CPT

## 2023-06-27 PROCEDURE — 87481 CANDIDA DNA AMP PROBE: CPT | Mod: 59 | Performed by: OBSTETRICS & GYNECOLOGY

## 2023-06-27 PROCEDURE — 83735 ASSAY OF MAGNESIUM: CPT | Performed by: STUDENT IN AN ORGANIZED HEALTH CARE EDUCATION/TRAINING PROGRAM

## 2023-06-27 PROCEDURE — 81002 URINALYSIS NONAUTO W/O SCOPE: CPT

## 2023-06-27 PROCEDURE — 80053 COMPREHEN METABOLIC PANEL: CPT | Performed by: STUDENT IN AN ORGANIZED HEALTH CARE EDUCATION/TRAINING PROGRAM

## 2023-06-27 PROCEDURE — 59025 FETAL NON-STRESS TEST: CPT

## 2023-06-27 RX ORDER — FLUCONAZOLE 150 MG/1
150 TABLET ORAL
Qty: 2 TABLET | Refills: 0 | Status: SHIPPED | OUTPATIENT
Start: 2023-06-27 | End: 2023-07-01

## 2023-06-27 NOTE — TELEPHONE ENCOUNTER
Spoke with midwife on call. Advised pt should come to [facility]. Pt was at hospital when I called. Will head up to [facility] now.

## 2023-06-27 NOTE — ED PROVIDER NOTES
Encounter Date: 2023       History     Chief Complaint   Patient presents with    Dizziness     HPI  Amanda Cazares is a 27 y.o. [id number] at 34w0d presents complaining of lightheadedness. This has been happening throughout pregnancy. It usually resolves if she lays down, but today it did not. The lightheadedness is associated with blurry vision changes. She has not ever passed out. Reports nausea, but denies vomiting. Reports occasional headaches, but is not having a headache right now. Denies chest pain, palpitations. Reports stable SOB which has been present throughout pregnancy.  This IUP is complicated by nothing.  Patient denies contractions, denies vaginal bleeding, denies LOF.   Fetal Movement: normal.     Review of patient's allergies indicates:   Allergen Reactions    Flagyl [metronidazole] Anaphylaxis    Shellfish containing products      No past medical history on file.  Past Surgical History:   Procedure Laterality Date    NOSE SURGERY       Family History   Problem Relation Age of Onset    Breast cancer Maternal Grandmother         Dx later in life    Miscarriages / Stillbirths Maternal Grandmother     Colon cancer Neg Hx      labor Neg Hx     Ovarian cancer Neg Hx     Melanoma Neg Hx      Social History     Tobacco Use    Smoking status: Never    Smokeless tobacco: Never   Substance Use Topics    Alcohol use: No    Drug use: No     Review of Systems   Constitutional:  Negative for fatigue and fever.   HENT:  Negative for sore throat.    Respiratory:  Negative for shortness of breath.    Cardiovascular:  Negative for chest pain and palpitations.   Gastrointestinal:  Positive for nausea. Negative for abdominal pain, constipation and vomiting.   Genitourinary:  Negative for dysuria, vaginal bleeding, vaginal discharge and vaginal pain.   Musculoskeletal:  Negative for back pain.   Skin:  Negative for rash.   Neurological:  Positive for dizziness, light-headedness and headaches. Negative for  weakness.   Hematological:  Does not bruise/bleed easily.     Physical Exam     Initial Vitals   BP Pulse Resp Temp SpO2   06/27/23 1004 06/27/23 1003 -- 06/27/23 1004 06/27/23 1004   102/68 69  97.7 °F (36.5 °C) 98 %      MAP       --                Physical Exam    Vitals reviewed.  Constitutional: She appears well-developed and well-nourished. She is not diaphoretic. No distress.   HENT:   Head: Normocephalic and atraumatic.   Nose: Nose normal.   Eyes: Conjunctivae and EOM are normal.   Neck: Neck supple.   Normal range of motion.  Cardiovascular:  Normal rate and intact distal pulses.           Pulmonary/Chest: Breath sounds normal. No respiratory distress. She exhibits no tenderness.   Normal work of breathing   Abdominal: Abdomen is soft. Bowel sounds are normal. There is no abdominal tenderness.   Gravid uterus There is no rebound and no guarding.   Musculoskeletal:         General: Normal range of motion.      Cervical back: Normal range of motion and neck supple.     Neurological: She is alert and oriented to person, place, and time.   Skin: Skin is warm and dry.   Psychiatric: She has a normal mood and affect. Her behavior is normal. Thought content normal.       ED Course   Obtain Fetal nonstress test (NST)    Date/Time: 6/27/2023 11:23 AM  Performed by: Paola Sidhu MD  Authorized by: Paola Sidhu MD     Nonstress Test:     Variability:  6-25 BPM    Decelerations:  None    Accelerations:  15 bpm    Acoustic Stimulator: No      Baseline:  150    Uterine Irritability: No      Contractions:  Not present  Labs Reviewed   CBC W/ AUTO DIFFERENTIAL - Abnormal; Notable for the following components:       Result Value    RBC 3.73 (*)     Hemoglobin 11.5 (*)     Hematocrit 33.8 (*)     Platelets 149 (*)     All other components within normal limits   COMPREHENSIVE METABOLIC PANEL - Abnormal; Notable for the following components:    CO2 21 (*)     Albumin 3.3 (*)     All other components within normal limits    VAGINOSIS SCREEN BY DNA PROBE   MAGNESIUM   PHOSPHORUS   POCT URINALYSIS, DIPSTICK OR TABLET REAGENT, AUTOMATED, WITH MICROSCOP   POCT GLUCOSE          Imaging Results    None          Medications - No data to display  Medical Decision Making:   ED Management:  - VSS  - FHT RR  - Ila quiet  - CBC, CMP, electrolytes wnl  - Urine dip wnl  - BG 75  - Given 1 L bolus with improvement in symptoms  - Stable for discharge, given strict ED return precautions  Other:   I have discussed this case with another health care provider.          Attending Attestation:   Physician Attestation Statement for Resident:  As the supervising MD   Physician Attestation Statement: I have personally seen and examined this patient.   I agree with the above history.  -:   As the supervising MD I agree with the above PE.     As the supervising MD I agree with the above treatment, course, plan, and disposition.   I was personally present during the critical portions of the procedure(s) performed by the resident and was immediately available in the ED to provide services and assistance as needed during the entire procedure.  I have reviewed and agree with the residents interpretation of the following: lab data.             Attending ED Notes:   I have personally seen and examined the patient. I agree with the resident's note . Questions welcomed and answered to patient satisfaction.      @ 34w0d presenting for dizziness, near syncope  NST: reactive and reassuring   Electrolytes reviewed, discussed magnesium supplementation, increased H2O    Agree with discharge to home, and given the following instructions: Resume normal activities, encouraged to hydrate well (3L or 100oz of fluids daily). Return to the OB ED for acute concerns such as vaginal bleeding, frequent or painful contractions, loss of fluid or decreased fetal movement.     Return to clinic as directed.     Ashlie Mullins MD  2023 11:25 PM                      Clinical Impression:   Final diagnoses:  [R42] Dizziness (Primary)  [Z3A.34] 34 weeks gestation of pregnancy        ED Disposition Condition    Discharge Stable          ED Prescriptions       Medication Sig Dispense Start Date End Date Auth. Provider    fluconazole (DIFLUCAN) 150 MG Tab Take 1 tablet (150 mg total) by mouth every 48 hours. for 4 days 2 tablet 6/27/2023 7/1/2023 Ashlie Mullins MD          Follow-up Information    None       Paola Sidhu MD/MPH  OB/GYN PGY4       Paola Sidhu MD  Resident  06/27/23 153       Ashlie Mullins MD  06/27/23 7508

## 2023-06-28 LAB
CANDIDA RRNA VAG QL PROBE: POSITIVE
T VAGINALIS RRNA GENITAL QL PROBE: NEGATIVE

## 2023-07-04 ENCOUNTER — PATIENT MESSAGE (OUTPATIENT)
Dept: OTHER | Facility: OTHER | Age: 28
End: 2023-07-04
Payer: MEDICAID

## 2023-07-12 ENCOUNTER — ROUTINE PRENATAL (OUTPATIENT)
Dept: OBSTETRICS AND GYNECOLOGY | Facility: CLINIC | Age: 28
End: 2023-07-12
Payer: MEDICAID

## 2023-07-12 ENCOUNTER — PATIENT MESSAGE (OUTPATIENT)
Dept: OBSTETRICS AND GYNECOLOGY | Facility: CLINIC | Age: 28
End: 2023-07-12

## 2023-07-12 VITALS
SYSTOLIC BLOOD PRESSURE: 108 MMHG | BODY MASS INDEX: 25.41 KG/M2 | WEIGHT: 150.38 LBS | DIASTOLIC BLOOD PRESSURE: 60 MMHG

## 2023-07-12 DIAGNOSIS — N76.0 VAGINITIS IN PREGNANCY IN THIRD TRIMESTER: Primary | ICD-10-CM

## 2023-07-12 DIAGNOSIS — E78.2 MIXED HYPERLIPIDEMIA: ICD-10-CM

## 2023-07-12 DIAGNOSIS — O99.113 BENIGN GESTATIONAL THROMBOCYTOPENIA IN THIRD TRIMESTER: ICD-10-CM

## 2023-07-12 DIAGNOSIS — Z3A.36 36 WEEKS GESTATION OF PREGNANCY: ICD-10-CM

## 2023-07-12 DIAGNOSIS — O23.593 VAGINITIS IN PREGNANCY IN THIRD TRIMESTER: Primary | ICD-10-CM

## 2023-07-12 DIAGNOSIS — D69.6 BENIGN GESTATIONAL THROMBOCYTOPENIA IN THIRD TRIMESTER: ICD-10-CM

## 2023-07-12 PROBLEM — Z34.90 PREGNANCY: Status: ACTIVE | Noted: 2023-06-05

## 2023-07-12 PROCEDURE — 99999 PR PBB SHADOW E&M-EST. PATIENT-LVL II: ICD-10-PCS | Mod: PBBFAC,,,

## 2023-07-12 PROCEDURE — 99212 OFFICE O/P EST SF 10 MIN: CPT | Mod: PBBFAC,TH

## 2023-07-12 PROCEDURE — 87086 URINE CULTURE/COLONY COUNT: CPT

## 2023-07-12 PROCEDURE — 87088 URINE BACTERIA CULTURE: CPT

## 2023-07-12 PROCEDURE — 99213 OFFICE O/P EST LOW 20 MIN: CPT | Mod: TH,S$PBB,,

## 2023-07-12 PROCEDURE — 99999 PR PBB SHADOW E&M-EST. PATIENT-LVL II: CPT | Mod: PBBFAC,,,

## 2023-07-12 PROCEDURE — 87077 CULTURE AEROBIC IDENTIFY: CPT

## 2023-07-12 PROCEDURE — 99213 PR OFFICE/OUTPT VISIT, EST, LEVL III, 20-29 MIN: ICD-10-PCS | Mod: TH,S$PBB,,

## 2023-07-12 PROCEDURE — 87081 CULTURE SCREEN ONLY: CPT

## 2023-07-12 NOTE — PROGRESS NOTES
Pregnancy dating, medications, labs, ultrasound reports, prenatal testing, and problem list; prior records and results; and available outside records were reviewed and updated in EMR.  In addition, past medical, surgical, social, family, and obstetric history were also reviewed and updated in EMR. Pertinent findings were noted below.    Reason for Visit   Routine Prenatal Visit    27 y.o.,  at 36w1d by 1T US    Complaints today: persistent pelvic discomfort. Seeing pelvic floor PT. Doing well without concerns.    Verified no history of genital HSV.    TW lbs   Contractions: [place]   Bleeding: No   Loss of fluid: No   Nausea: Yes   Vomiting: No   Headache: Yes       Fetal Movement: Yes     PHYSICAL EXAM  GENERAL: No acute distress  HEENT: Normocephalic, atraumatic  NEURO: Alert and oriented x3  PSYCH: Normal mood and affect  PULMONARY: Non-labored respiration; no tachypnea  ABD: Soft, gravid, nontender.    ASSESSMENT AND PLAN  Vaginitis in pregnancy in third trimester    36 weeks gestation of pregnancy  -     STREP B SCREEN, VAGINAL / RECTAL  -     CULTURE, URINE    Benign gestational thrombocytopenia in third trimester    Mixed hyperlipidemia       GBS collected today. Reviewed Allergies: Flagyl [metronidazole] and Shellfish containing products   Reviewed [place] department of Health recommendation for repeat HIV/RPR today; patient had this done already     Reviewed warning signs, normal FM,  labor precautions, and how/when to call.      Pt stated interest in speaking to a therapist. Referral placed.    Follow-up: 1 week, call or present sooner NAVEED Stephenson CNM

## 2023-07-13 ENCOUNTER — PATIENT MESSAGE (OUTPATIENT)
Dept: PSYCHIATRY | Facility: CLINIC | Age: 28
End: 2023-07-13
Payer: MEDICAID

## 2023-07-14 DIAGNOSIS — N30.00 ACUTE CYSTITIS WITHOUT HEMATURIA: Primary | ICD-10-CM

## 2023-07-14 LAB — BACTERIA UR CULT: ABNORMAL

## 2023-07-14 RX ORDER — AMOXICILLIN AND CLAVULANATE POTASSIUM 875; 125 MG/1; MG/1
1 TABLET, FILM COATED ORAL EVERY 12 HOURS
Qty: 10 TABLET | Refills: 0 | Status: SHIPPED | OUTPATIENT
Start: 2023-07-14 | End: 2023-07-19

## 2023-07-15 LAB — BACTERIA SPEC AEROBE CULT: ABNORMAL

## 2023-07-17 ENCOUNTER — CLINICAL SUPPORT (OUTPATIENT)
Dept: REHABILITATION | Facility: HOSPITAL | Age: 28
End: 2023-07-17
Payer: MEDICAID

## 2023-07-17 DIAGNOSIS — N81.89 PELVIC FLOOR WEAKNESS: ICD-10-CM

## 2023-07-17 DIAGNOSIS — O26.893 PELVIC PAIN IN PREGNANCY, ANTEPARTUM, THIRD TRIMESTER: ICD-10-CM

## 2023-07-17 DIAGNOSIS — M62.89 PELVIC FLOOR TENSION: Primary | ICD-10-CM

## 2023-07-17 DIAGNOSIS — R27.8 COORDINATION IMPAIRMENT: ICD-10-CM

## 2023-07-17 DIAGNOSIS — R10.2 PELVIC PAIN IN PREGNANCY, ANTEPARTUM, THIRD TRIMESTER: ICD-10-CM

## 2023-07-17 DIAGNOSIS — R27.9 LACK OF COORDINATION: ICD-10-CM

## 2023-07-17 PROCEDURE — 97530 THERAPEUTIC ACTIVITIES: CPT | Mod: PN

## 2023-07-17 NOTE — PROGRESS NOTES
"  Pelvic Health Physical Therapy   Treatment Note and Discharge Note     Name: Amanda Cazares  Clinic Number: [id number]    Therapy Diagnosis:   Encounter Diagnoses   Name Primary?    Pelvic floor tension Yes    Coordination impairment     Pelvic pain in pregnancy, antepartum, third trimester     Pelvic floor weakness     Lack of coordination      Physician: Argelia Chapman CNM    Visit Date: 7/17/2023    Physician Orders: Physical Therapy evaluate and treat  Medical Diagnosis: pelvic pain in pregnancy, antepartum, third trimester  Evaluation Date: 6/22/2023  Authorization Period Expiration: 6/20/2024  Plan of Care Certification Period: 9/22/2023  Visit #/Visits authorized: 1/ 16 (+eval)       Time In: 9:07  Time Out: 9:54  Total Billable Time: 47 minutes    Precautions:  Pregnancy    Subjective     Pt reports: has not been very active recently due to feeling dizzy and not feeling very well. Reports she has not been fully compliant with HEP. Did not use SI band as it never arrived in the mail. Reports "ripping" feeling after laying on her side during the night for a prolonged period of time. Reports that the abdominal bracing does help with pain. Is not having as much pain with walking. The most pain is with laying down. Is currently 36w. Still reports some back pain but is not as intense as it was before. Is planning to have a natural birth, hopefully a water birth.     She was compliant with home exercise program.  Response to previous treatment: good  Functional change: overall decrease in pain    Pain: 0/10  Location: none today    Objective     No objective measures taken today.     Treatment     Amanda participated in dynamic functional therapeutic activities to improve functional performance for 47  minutes, including:  Birth prep and labor positioning with biofeedback       Home Exercises Provided and Patient Education Provided     Education provided:   - anatomy/physiology of pelvic floor, posture/body " mechanices, diaphragmatic breathing, isometric abdominal exercises, perineal stretching/massage, proper bearing down techniques, and behavior modifications  Discussed progression of plan of care with patient; educated pt in activity modification; reviewed HEP with pt. Pt demonstrated and verbalized understanding of all instruction and was provided with a handout of HEP (see Patient Instructions).  - diaphragmatic breathing  - transverse abdominis abdominal bracing  - using an SI belt    Written Home Exercises Provided: Patient instructed to cont prior HEP.  Exercises were reviewed and Amanda was able to demonstrate them prior to the end of the session.  Amanda demonstrated good  understanding of the education provided.     See EMR under Patient Instructions for exercises provided prior visit.    Assessment     Patient tolerated treatment. With biofeedback machine, labor positions with decreased pelvic floor tension were supine, right sidelying, sitting EOB, and standing upright.     Amanda Is progressing well towards her goals.   Pt prognosis is Fair.     Pt will continue to benefit from skilled outpatient physical therapy to address the deficits listed in the problem list box on initial evaluation, provide pt/family education and to maximize pt's level of independence in the home and community environment.     Pt's spiritual, cultural and educational needs considered and pt agreeable to plan of care and goals.     Anticipated barriers to physical therapy: scheduling    Goals:   Short Term Goals: 6 weeks   - Pt will demonstrate excellent knowledge and adherence to HEP to facilitate optimal recovery.  - Pt will demonstrate proper PFM contraction, relaxation, and lengthening coordinated with TA and breath for improved muscle coordination needed for functional activity.  - Patient will report 40% decrease in pelvic pain with ADLs.      Long Term Goals: 12 weeks   - Pt will demonstrate excellent knowledge and adherence  to [facility] for continued self-maintenance of symptoms.  - Pt will report no incidence of urinary incontinence 7/7 days for improved hygiene and ADL/IADL tolerance.   - Pt will demonstrate PFM strength of at least 3/5 MMT for improved strength needed to maintain continence.   - Pt will report bearing down appropriately 100% of the time for improved bowel function and decreased stress on adjacent pelvic structures.   - Pt will be able to successfully complete exercise workouts for 7/7 days without pain for improved ADL tolerance.     Plan     Plan to discharge to independent [facility].    Winifred Winslow, PT

## 2023-07-20 ENCOUNTER — ROUTINE PRENATAL (OUTPATIENT)
Dept: OBSTETRICS AND GYNECOLOGY | Facility: CLINIC | Age: 28
End: 2023-07-20
Payer: MEDICAID

## 2023-07-20 VITALS
DIASTOLIC BLOOD PRESSURE: 60 MMHG | SYSTOLIC BLOOD PRESSURE: 108 MMHG | BODY MASS INDEX: 25.34 KG/M2 | WEIGHT: 149.94 LBS

## 2023-07-20 DIAGNOSIS — O99.113 BENIGN GESTATIONAL THROMBOCYTOPENIA IN THIRD TRIMESTER: Primary | ICD-10-CM

## 2023-07-20 DIAGNOSIS — D69.6 BENIGN GESTATIONAL THROMBOCYTOPENIA IN THIRD TRIMESTER: Primary | ICD-10-CM

## 2023-07-20 DIAGNOSIS — Z3A.37 37 WEEKS GESTATION OF PREGNANCY: ICD-10-CM

## 2023-07-20 PROCEDURE — 99999 PR PBB SHADOW E&M-EST. PATIENT-LVL II: ICD-10-PCS | Mod: PBBFAC,,,

## 2023-07-20 PROCEDURE — 99999 PR PBB SHADOW E&M-EST. PATIENT-LVL II: CPT | Mod: PBBFAC,,,

## 2023-07-20 PROCEDURE — 99213 PR OFFICE/OUTPT VISIT, EST, LEVL III, 20-29 MIN: ICD-10-PCS | Mod: TH,S$PBB,,

## 2023-07-20 PROCEDURE — 99213 OFFICE O/P EST LOW 20 MIN: CPT | Mod: TH,S$PBB,,

## 2023-07-20 PROCEDURE — 99212 OFFICE O/P EST SF 10 MIN: CPT | Mod: PBBFAC,TH

## 2023-07-20 NOTE — PROGRESS NOTES
Due for COVID booster. Will consider.    Depression screen higher than 3 on abbreviated EPDS. Psych referral previously made.

## 2023-07-20 NOTE — PROGRESS NOTES
Pregnancy dating, medications, labs, ultrasound reports, prenatal testing, and problem list; prior records and results; and available outside records were reviewed and updated in EMR.  In addition, past medical, surgical, social, family, and obstetric history were also reviewed and updated in EMR. Pertinent findings were noted below.    Reason for Visit   Routine Prenatal Visit      27 y.o.,  at 37w2d    Complaints today: anxiety/stress. Therapy appt . Wants to be seen sooner. Denies SI/HI. Doing well without concerns.  TW lbs   Contractions: irregular   Bleeding: No   Loss of fluid: No   Nausea: yes   Vomiting: No   Headache: No       Fetal Movement: Yes       PHYSICAL EXAM  GENERAL: No acute distress  HEENT: Normocephalic, atraumatic  NEURO: Alert and oriented x3  PSYCH: Normal mood and affect  PULMONARY: Non-labored respiration; no tachypnea  ABD: Soft, gravid, nontender.      ASSESSMENT AND PLAN  Benign gestational thrombocytopenia in third trimester    37 weeks gestation of pregnancy       Delivery consents previously signed   Reviewed GBS positive and need for intrapartum abx   Reviewed repeat 3rd trimester HIV/RPR previously completed   SVE per pt request. /-3   Education regarding labor precautions.   Reviewed warning signs, normal FM, and how/when to call.      Follow-up: 1 week, call or present sooner PRADELA Stephenson CNM

## 2023-07-27 ENCOUNTER — ROUTINE PRENATAL (OUTPATIENT)
Dept: OBSTETRICS AND GYNECOLOGY | Facility: CLINIC | Age: 28
End: 2023-07-27
Payer: MEDICAID

## 2023-07-27 VITALS
BODY MASS INDEX: 25.74 KG/M2 | SYSTOLIC BLOOD PRESSURE: 108 MMHG | WEIGHT: 152.31 LBS | DIASTOLIC BLOOD PRESSURE: 68 MMHG

## 2023-07-27 DIAGNOSIS — Z3A.38 38 WEEKS GESTATION OF PREGNANCY: Primary | ICD-10-CM

## 2023-07-27 DIAGNOSIS — N76.0 VULVOVAGINITIS: ICD-10-CM

## 2023-07-27 DIAGNOSIS — R82.71 GBS BACTERIURIA: ICD-10-CM

## 2023-07-27 DIAGNOSIS — Z88.0 HISTORY OF PENICILLIN ALLERGY: ICD-10-CM

## 2023-07-27 DIAGNOSIS — O99.820 GBS (GROUP B STREPTOCOCCUS CARRIER), +RV CULTURE, CURRENTLY PREGNANT: ICD-10-CM

## 2023-07-27 PROCEDURE — 87081 CULTURE SCREEN ONLY: CPT | Performed by: ADVANCED PRACTICE MIDWIFE

## 2023-07-27 PROCEDURE — 99999 PR PBB SHADOW E&M-EST. PATIENT-LVL II: ICD-10-PCS | Mod: PBBFAC,,, | Performed by: ADVANCED PRACTICE MIDWIFE

## 2023-07-27 PROCEDURE — 99214 PR OFFICE/OUTPT VISIT, EST, LEVL IV, 30-39 MIN: ICD-10-PCS | Mod: TH,S$PBB,, | Performed by: ADVANCED PRACTICE MIDWIFE

## 2023-07-27 PROCEDURE — 99212 OFFICE O/P EST SF 10 MIN: CPT | Mod: PBBFAC,TH | Performed by: ADVANCED PRACTICE MIDWIFE

## 2023-07-27 PROCEDURE — 87184 SC STD DISK METHOD PER PLATE: CPT | Performed by: ADVANCED PRACTICE MIDWIFE

## 2023-07-27 PROCEDURE — 81514 NFCT DS BV&VAGINITIS DNA ALG: CPT | Performed by: ADVANCED PRACTICE MIDWIFE

## 2023-07-27 PROCEDURE — 99999 PR PBB SHADOW E&M-EST. PATIENT-LVL II: CPT | Mod: PBBFAC,,, | Performed by: ADVANCED PRACTICE MIDWIFE

## 2023-07-27 PROCEDURE — 99214 OFFICE O/P EST MOD 30 MIN: CPT | Mod: TH,S$PBB,, | Performed by: ADVANCED PRACTICE MIDWIFE

## 2023-07-27 PROCEDURE — 87077 CULTURE AEROBIC IDENTIFY: CPT | Performed by: ADVANCED PRACTICE MIDWIFE

## 2023-07-27 RX ORDER — TERCONAZOLE 4 MG/G
1 CREAM VAGINAL NIGHTLY
Qty: 45 G | Refills: 0 | Status: SHIPPED | OUTPATIENT
Start: 2023-07-27 | End: 2023-08-03

## 2023-07-27 RX ORDER — NITROFURANTOIN 25; 75 MG/1; MG/1
100 CAPSULE ORAL 2 TIMES DAILY
Qty: 10 CAPSULE | Refills: 0 | Status: SHIPPED | OUTPATIENT
Start: 2023-07-27 | End: 2023-08-01

## 2023-07-27 NOTE — PROGRESS NOTES
Pregnancy dating, medications, labs, ultrasound reports, prenatal testing, and problem list; prior records and results; and available outside records were reviewed and updated in EMR.  In addition, past medical, surgical, social, family, and obstetric history were also reviewed and updated in EMR. Pertinent findings were noted below.    Reason for Visit   Routine Prenatal Visit    27 y.o.,  at 38w2d here for routine visit, accompanied by her mother.    Complaints today: vulvar swelling and redness, very sensitive to touch and irritated. Pt also reports anal itching.      In addition, pt reports she has a severe Penicillin Allergy and she now realizes she did not previously update that with us. Was notified this last week she has a UTI (which she reports she has no symptoms from - denies dysuria, foul odor, blood in urine, etc) and was prescribed Augmentin, but she has not taken this, secondary to PCN allergy. Desires to defer treatment if able.    TW lbs   Contractions: No   Bleeding: No   Loss of fluid: No   Nausea: No   Vomiting: No   Headache: No       Fetal Movement: Yes       PHYSICAL EXAM  GENERAL: No acute distress  HEENT: Normocephalic, atraumatic  NEURO: Alert and oriented x3  PSYCH: Normal mood and affect  PULMONARY: Non-labored respiration; no tachypnea  ABD: Soft, gravid, nontender.  PELVIC: Normal female hair distribution.  External genitalia with generalized edema throughout entire bilateral labia majora and minora, with generalized moderate erythema; without lesions or sores. Moderate amount creamy white discharge. Adequate perineal body. Urethral meatus without lesions or prolapse. Urethra: no masses, tenderness, or scarring.  Bladder: without tenderness or masses.  Vaginal mucosa moist and pink, normal rugae, without lesions, or foul odor.  Affirm collected, along with repeat GBS culture.  SVE: 2/60/-3    ASSESSMENT AND PLAN  38 weeks gestation of pregnancy    GBS (group B Streptococcus  carrier), +RV culture, currently pregnant  -     STREP B SCREEN, VAGINAL / RECTAL    Vulvovaginitis  -     terconazole (TERAZOL 7) 0.4 % Crea; Place 1 applicator vaginally every evening. for 7 days  Dispense: 45 g; Refill: 0  -     VAGINOSIS SCREEN BY DNA PROBE    History of penicillin allergy  -     STREP B SCREEN, VAGINAL / RECTAL    GBS bacteriuria  -     nitrofurantoin, macrocrystal-monohydrate, (MACROBID) 100 MG capsule; Take 1 capsule (100 mg total) by mouth 2 (two) times daily. for 5 days  Dispense: 10 capsule; Refill: 0    Called microbiology dept - prior GBS specimen no longer available so repeat culture collected today in attempt to obtain sensitivity data. If data unavailable at time of labor (or repeat culture with false-negative), discussed would plan to use IV Vancomycin intrapartum.  Consult with Dr. Ricks - in agreement regarding above; also discussed Urine culture positive secondary to high GBS count; as pt is asymptomatic and term, may defer treatment for labor. Discussed risks/benefits with pt. Rx provided and appropriate use discussed. Pt will consider.  Delivery consents have been signed (see media 7/12/23)  Reviewed repeat 3rd trimester HIV/RPR neg  Education regarding labor precautions.  Reviewed warning signs, normal FM, and how/when to call.      Follow-up: 1 week, call or present sooner NAVEED Espinosa CNM    I spent a total of 32 minutes on the day of the visit. This includes face to face time and non-face to face time preparing to see the patient (eg, review of tests), Obtaining and/or reviewing separately obtained history, Documenting clinical information in the electronic or other health record, Independently interpreting resultsand communicating results to the patient/family/caregiver, or Care coordination.     Annabel Espinosa CNM, MSN

## 2023-07-29 LAB — BACTERIA SPEC AEROBE CULT: ABNORMAL

## 2023-08-02 ENCOUNTER — ROUTINE PRENATAL (OUTPATIENT)
Dept: OBSTETRICS AND GYNECOLOGY | Facility: CLINIC | Age: 28
End: 2023-08-02
Payer: MEDICAID

## 2023-08-02 VITALS
BODY MASS INDEX: 25.39 KG/M2 | WEIGHT: 150.25 LBS | DIASTOLIC BLOOD PRESSURE: 70 MMHG | SYSTOLIC BLOOD PRESSURE: 110 MMHG

## 2023-08-02 DIAGNOSIS — Z34.83 ENCOUNTER FOR SUPERVISION OF OTHER NORMAL PREGNANCY IN THIRD TRIMESTER: Primary | ICD-10-CM

## 2023-08-02 PROCEDURE — 99213 PR OFFICE/OUTPT VISIT, EST, LEVL III, 20-29 MIN: ICD-10-PCS | Mod: S$PBB,TH,, | Performed by: ADVANCED PRACTICE MIDWIFE

## 2023-08-02 PROCEDURE — 99999 PR PBB SHADOW E&M-EST. PATIENT-LVL II: CPT | Mod: PBBFAC,,, | Performed by: ADVANCED PRACTICE MIDWIFE

## 2023-08-02 PROCEDURE — 99999 PR PBB SHADOW E&M-EST. PATIENT-LVL II: ICD-10-PCS | Mod: PBBFAC,,, | Performed by: ADVANCED PRACTICE MIDWIFE

## 2023-08-02 PROCEDURE — 99212 OFFICE O/P EST SF 10 MIN: CPT | Mod: PBBFAC,TH | Performed by: ADVANCED PRACTICE MIDWIFE

## 2023-08-02 PROCEDURE — 99213 OFFICE O/P EST LOW 20 MIN: CPT | Mod: S$PBB,TH,, | Performed by: ADVANCED PRACTICE MIDWIFE

## 2023-08-02 NOTE — PROGRESS NOTES
27 y.o. female  at 39w1d, by Estimated Date of Delivery: 23    Complaints today: occas ctx . Doing well today.Alone today  Reviewed TWG: 10 lbs    ROS  OBSTETRICS:   Contractions: Nothing regular   Bleeding No   Loss of fluid No   Fetal mvmnt yes  GASTRO:   Nausea No   Vomiting No      OB History    Para Term  AB Living   2 1 1     1   SAB IAB Ectopic Multiple Live Births           1      # Outcome Date GA Lbr Zac/2nd Weight Sex Delivery Anes PTL Lv   2 Current            1 Term 18 40w5d 07:45 / 00:47 3.78 kg (8 lb 5.3 oz) M Vag-Spont None N LINDA       Dating reviewed  Allergies and problem list reviewed and updated  Medical and surgical history reviewed  Prenatal labs reviewed and updated    PHYSICAL EXAM  /70   Wt 68.2 kg (150 lb 3.9 oz)   LMP 2022   BMI 25.39 kg/m²     GENERAL: No acute distress  HEENT: Normocephalic, atraumatic  NEURO: Alert and oriented x3  PSYCH: Normal mood and affect  PULMONARY: Non-labored respiration; no tachypnea  ABD: Soft, gravid, nontender.      ASSESSMENT AND PLAN     Problems (from 23 to present)     Problem Noted Resolved    GBS (group B Streptococcus carrier), +RV culture, currently pregnant 2023 by Annabel Espinosa CNM No    Pregnancy 2023 by Argelia Chapman CNM No    Overview Addendum 2023  8:22 AM by Evelyne Stephenson CNM     Connected Mom:   Prepregnancy BMI:  21  Pap: unknown. Pap pp  Dating - by 1st trimester US  U/S - reports complete, 31 wk growth subopt d/t advanced GA  Aneuploidy screening - declined  Blood type: O POS.   GDM screen - passed 1hr   Vaccines -   [ ]Flu  [x]Covid-19 - x1, no boosters  [x ]TDAP  Contraception - IUD  Peds - Dr Martinez  Circ - n/a  GBS  [ ]Consents                 Delivery consents signed.  Discussed postdates management and IOL - patient prefers to await spontaneous labor if possible    Discussed postdates prenatal testing and that IOL would be recommended immediately if  prenatal testing is not reassuring; patient states understanding and agrees to this.    F/U 1 week

## 2023-08-04 ENCOUNTER — OFFICE VISIT (OUTPATIENT)
Dept: PSYCHIATRY | Facility: CLINIC | Age: 28
End: 2023-08-04
Payer: MEDICAID

## 2023-08-04 DIAGNOSIS — F41.9 ANXIETY DURING PREGNANCY: Primary | ICD-10-CM

## 2023-08-04 DIAGNOSIS — Z3A.36 36 WEEKS GESTATION OF PREGNANCY: ICD-10-CM

## 2023-08-04 DIAGNOSIS — O99.340 ANXIETY DURING PREGNANCY: Primary | ICD-10-CM

## 2023-08-04 PROCEDURE — 90785 PSYTX COMPLEX INTERACTIVE: CPT | Mod: AJ,HB,95, | Performed by: SOCIAL WORKER

## 2023-08-04 PROCEDURE — 90791 PSYCH DIAGNOSTIC EVALUATION: CPT | Mod: AJ,HB,95, | Performed by: SOCIAL WORKER

## 2023-08-04 PROCEDURE — 90791 PR PSYCHIATRIC DIAGNOSTIC EVALUATION: ICD-10-PCS | Mod: AJ,HB,95, | Performed by: SOCIAL WORKER

## 2023-08-04 PROCEDURE — 90785 PR INTERACTIVE COMPLEXITY: ICD-10-PCS | Mod: AJ,HB,95, | Performed by: SOCIAL WORKER

## 2023-08-04 NOTE — PROGRESS NOTES
The patient location is: [place]  The chief complaint leading to consultation is: anxiety    Visit type: audiovisual    Face to Face time with patient: 45 minutes  60 minutes of total time spent on the encounter, which includes face to face time and non-face to face time preparing to see the patient (eg, review of tests), Obtaining and/or reviewing separately obtained history, Documenting clinical information in the electronic or other health record, Independently interpreting results (not separately reported) and communicating results to the patient/family/caregiver, or Care coordination (not separately reported).     Each patient to whom he or she provides medical services by telemedicine is:  (1) informed of the relationship between the physician and patient and the respective role of any other health care provider with respect to management of the patient; and (2) notified that he or she may decline to receive medical services by telemedicine and may withdraw from such care at any time.    Notes:   Psychiatry Initial Visit (PhD/LCSW)  Diagnostic Interview - CPT 02616    Date: 8/4/2023    Site: Telemed    Referral source: Dr. Stephenson    Clinical status of patient: Outpatient    Amanda Cazares, a 27 y.o. female, for initial evaluation visit.  Met with patient.    Chief complaint/reason for encounter: anxiety    History of present illness: Reports that she is 39 weeks pregnant. States that she is having a tough time the last couple of weeks. Reports that this is patient's second child. Reports that the family is going through a lot of changes that have caused her a lot of stress. States that when she spoke with OB anxiety was high but it has gotten a little better. Some of the uncertainties have worked themselves out. Still thinks that it would helpful to have some support after the birth of her child. States that over the last year they did a lot of traveling to see where they wanted to live in the future. States  "that when they returned to the [facility] things went down in the career aspect. States that they know they don't want to stay in the [facility] but don't know where they want to live their life. 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 Reports that she is currently in school but still has several years before she is able to work.  has been struggling with burnout at work. States that after they have the baby they plan to move to [place] to be close to family to help with burnout.     Reports that she has had problems with depression and anxiety in the past. States that when she was a teenager struggled with depression. States that she was never treated for it at the time. Was good for a couple years and it came back. States that she has managed her symptoms through yoga and meditation. States that with current pregnancy has experienced a lot of stress and anxiety. States that when she is anxious she notices shortness of breath, decreased appetite, decreased sleep, and thoughts of not knowing. States that she was also crying a lot. Endorses feelings of restlessness and being unable to do anything to help fix it. Endorses problems with concentration, states that when she is in "survival mode" she is hyper focused on one thing and how to solve one problem. States that makes it difficult to be mindful of all the things going on in life. Some increased irritability, mostly with son at the moment. States that she is less patient.     Reports that sleep is okay. States that for a couple of nights had to take sleep aid to get to sleep. States that it was the first night that she slept in a long time. Prior to taking medication she would struggle to fall asleep. Appetite has returned, not very hungry but is trying to eat a couple times a day. Denies problems with ADLs.     Reports that patient wanted to get pregnant but  was not on the same page. States that she told him about stopping birth control. When " "she got pregnant he was in shock and wasn't prepared to have another baby. Physically pregnancy has been okay. States that something has always been going on which have taken a toll on her physically.     Symptoms:   Mood: depressed mood and tearfulness  Anxiety: excessive anxiety/worry and restlessness/keyed up  Substance abuse: denied  Cognitive functioning: denied  Health behaviors: noncontributory    Psychiatric history: No history of seeing a psychiatrist. States that she has seen therapist in the past for a few sessions. Never hospitalized for psychiatric reasons. No medication for psychiatric reasons.     Medical history: none    Family history of psychiatric illness:  brother - depression/anxiety; mother - depression/anxiety    OB/GYN Status:  Current or Most Recent Pregnancy:  Current Pregnancy Status: 39 weeks  Pregnancy Desired?: Pregnancy is desired.  Pregnancy Complications: None  Pregnancy Exposures: None    Pregnancy History:  Pregnancy Status: Previous pregnancies: 1.  Live births: 1.  Miscarriages: 0 . Elective abortions: 0.  Number of Children (and ages): Son, 4 y/o (Fhaeem); pregnant with daughter (no name yet)  Infertility:  n/a  Miscarriage/Loss: n/a    Postpartum Status:  Delivery:   Birth and Delivery Trauma: none  History of Postpartum Mood and Anxiety Disorders:well and happy    Mental Wellness:  Nutrition: eats less than three meals a day and decreased/no appetite  Exercise: not active  Sleep: unable to sleep due to mood/anxiety symptoms    Social history (marriage, employment, etc.): Born and raised in [place]. Reports that childhood was "okay". Parents  when patient was younger. Has one older brother. Lived with mother until 6/8 y/o. Moved with father in [facility] for a couple of years. Didn't have a good relationship with step mother when she was younger. Mother moved to the [facility] and had two more children with new . Didn't feel like she had supportive parents when she was " younger. Graduated high school. Currently in school, digital communication/computer science. Not currently working.  is a palma, they own palma shop. Never arrested. No . States that they plan to move to [place] as soon as she feels up to moving.  is from [facility] in the [facility] for the last 7 years, Together for 8 years    Substance use:   Alcohol: none   Drugs: none   Tobacco: none   Caffeine: occasional    Current medications and drug reactions (include OTC, herbal): see medication list    Strengths and liabilities: Strength: Patient accepts guidance/feedback, Strength: Patient is expressive/articulate., Strength: Patient is intelligent., Strength: Patient is motivated for change., Strength: Patient is physically healthy., Liability: Patient lacks coping skills.    Current Evaluation:     Mental Status Exam:  General Appearance:  unremarkable, age appropriate   Speech: normal tone, normal rate, normal pitch, normal volume      Level of Cooperation: cooperative      Thought Processes: normal and logical   Mood: euthymic      Thought Content: normal, no suicidality, no homicidality, delusions, or paranoia   Affect: congruent and appropriate   Orientation: Oriented x3   Memory: recent >  intact, remote >  intact   Attention Span & Concentration: intact   Fund of General Knowledge: intact and appropriate to age and level of education   Abstract Reasoning: Did not assess   Judgment & Insight: fair     Language  intact     Diagnostic Impression - Plan:       ICD-10-CM ICD-9-CM   1. Anxiety during pregnancy  O99.340 648.43    F41.9 300.00       Plan:individual psychotherapy    Return to Clinic: 2 weeks, 1 month    Length of Service (minutes): 45    Scheduled follow up for 8/22 at 1pm.

## 2023-08-08 ENCOUNTER — HOSPITAL ENCOUNTER (INPATIENT)
Facility: OTHER | Age: 28
LOS: 2 days | Discharge: HOME | End: 2023-08-10
Attending: OBSTETRICS & GYNECOLOGY | Admitting: OBSTETRICS & GYNECOLOGY
Payer: MEDICAID

## 2023-08-08 ENCOUNTER — ANESTHESIA (OUTPATIENT)
Dept: OBSTETRICS AND GYNECOLOGY | Facility: OTHER | Age: 28
End: 2023-08-08

## 2023-08-08 ENCOUNTER — ANESTHESIA EVENT (OUTPATIENT)
Dept: OBSTETRICS AND GYNECOLOGY | Facility: OTHER | Age: 28
End: 2023-08-08

## 2023-08-08 DIAGNOSIS — Z37.9 NORMAL LABOR: ICD-10-CM

## 2023-08-08 DIAGNOSIS — O99.820 GBS (GROUP B STREPTOCOCCUS CARRIER), +RV CULTURE, CURRENTLY PREGNANT: ICD-10-CM

## 2023-08-08 LAB
ABO + RH BLD: NORMAL
BASOPHILS # BLD AUTO: 0.03 K/UL (ref 0–0.2)
BASOPHILS NFR BLD: 0.2 % (ref 0–1.9)
BLD GP AB SCN CELLS X3 SERPL QL: NORMAL
DIFFERENTIAL METHOD: ABNORMAL
EOSINOPHIL # BLD AUTO: 0 K/UL (ref 0–0.5)
EOSINOPHIL NFR BLD: 0.1 % (ref 0–8)
ERYTHROCYTE [DISTWIDTH] IN BLOOD BY AUTOMATED COUNT: 13.1 % (ref 11.5–14.5)
HCT VFR BLD AUTO: 33.3 % (ref 37–48.5)
HGB BLD-MCNC: 11.4 G/DL (ref 12–16)
IMM GRANULOCYTES # BLD AUTO: 0.06 K/UL (ref 0–0.04)
IMM GRANULOCYTES NFR BLD AUTO: 0.5 % (ref 0–0.5)
LYMPHOCYTES # BLD AUTO: 1.5 K/UL (ref 1–4.8)
LYMPHOCYTES NFR BLD: 12.4 % (ref 18–48)
MCH RBC QN AUTO: 31.2 PG (ref 27–31)
MCHC RBC AUTO-ENTMCNC: 34.2 G/DL (ref 32–36)
MCV RBC AUTO: 91 FL (ref 82–98)
MONOCYTES # BLD AUTO: 0.4 K/UL (ref 0.3–1)
MONOCYTES NFR BLD: 3.5 % (ref 4–15)
NEUTROPHILS # BLD AUTO: 10.2 K/UL (ref 1.8–7.7)
NEUTROPHILS NFR BLD: 83.3 % (ref 38–73)
NRBC BLD-RTO: 0 /100 WBC
PLATELET # BLD AUTO: 129 K/UL (ref 150–450)
PMV BLD AUTO: 12.5 FL (ref 9.2–12.9)
RBC # BLD AUTO: 3.65 M/UL (ref 4–5.4)
SPECIMEN OUTDATE: NORMAL
WBC # BLD AUTO: 12.28 K/UL (ref 3.9–12.7)

## 2023-08-08 PROCEDURE — 25000003 PHARM REV CODE 250: Performed by: ADVANCED PRACTICE MIDWIFE

## 2023-08-08 PROCEDURE — 72200004 HC VAGINAL DELIVERY LEVEL I

## 2023-08-08 PROCEDURE — 72200005 HC VAGINAL DELIVERY LEVEL II

## 2023-08-08 PROCEDURE — 59409 PR OBSTETRICAL CARE,VAG DELIV ONLY: ICD-10-PCS | Mod: GB,,, | Performed by: ADVANCED PRACTICE MIDWIFE

## 2023-08-08 PROCEDURE — 11000001 HC ACUTE MED/SURG PRIVATE ROOM

## 2023-08-08 PROCEDURE — 99285 EMERGENCY DEPT VISIT HI MDM: CPT

## 2023-08-08 PROCEDURE — 25000003 PHARM REV CODE 250: Performed by: OBSTETRICS & GYNECOLOGY

## 2023-08-08 PROCEDURE — 59409 OBSTETRICAL CARE: CPT | Mod: GB,,, | Performed by: ADVANCED PRACTICE MIDWIFE

## 2023-08-08 PROCEDURE — 85025 COMPLETE CBC W/AUTO DIFF WBC: CPT | Performed by: ADVANCED PRACTICE MIDWIFE

## 2023-08-08 PROCEDURE — 86900 BLOOD TYPING SEROLOGIC ABO: CPT | Performed by: ADVANCED PRACTICE MIDWIFE

## 2023-08-08 PROCEDURE — 72100002 HC LABOR CARE, 1ST 8 HOURS

## 2023-08-08 PROCEDURE — 63600175 PHARM REV CODE 636 W HCPCS: Performed by: ADVANCED PRACTICE MIDWIFE

## 2023-08-08 PROCEDURE — 36415 COLL VENOUS BLD VENIPUNCTURE: CPT | Performed by: ADVANCED PRACTICE MIDWIFE

## 2023-08-08 RX ORDER — PRENATAL WITH FERROUS FUM AND FOLIC ACID 3080; 920; 120; 400; 22; 1.84; 3; 20; 10; 1; 12; 200; 27; 25; 2 [IU]/1; [IU]/1; MG/1; [IU]/1; MG/1; MG/1; MG/1; MG/1; MG/1; MG/1; UG/1; MG/1; MG/1; MG/1; MG/1
1 TABLET ORAL DAILY
Status: DISCONTINUED | OUTPATIENT
Start: 2023-08-08 | End: 2023-08-10 | Stop reason: HOSPADM

## 2023-08-08 RX ORDER — ACETAMINOPHEN 325 MG/1
650 TABLET ORAL EVERY 6 HOURS PRN
Status: DISCONTINUED | OUTPATIENT
Start: 2023-08-08 | End: 2023-08-10 | Stop reason: HOSPADM

## 2023-08-08 RX ORDER — MISOPROSTOL 200 UG/1
800 TABLET ORAL ONCE AS NEEDED
Status: DISCONTINUED | OUTPATIENT
Start: 2023-08-08 | End: 2023-08-08

## 2023-08-08 RX ORDER — BACITRACIN ZINC 500 UNIT/G
OINTMENT (GRAM) TOPICAL 2 TIMES DAILY
Status: DISCONTINUED | OUTPATIENT
Start: 2023-08-08 | End: 2023-08-10 | Stop reason: HOSPADM

## 2023-08-08 RX ORDER — ACETAMINOPHEN 325 MG/1
650 TABLET ORAL EVERY 6 HOURS PRN
Status: DISCONTINUED | OUTPATIENT
Start: 2023-08-08 | End: 2023-08-08

## 2023-08-08 RX ORDER — LIDOCAINE HYDROCHLORIDE 10 MG/ML
10 INJECTION INFILTRATION; PERINEURAL ONCE AS NEEDED
Status: DISCONTINUED | OUTPATIENT
Start: 2023-08-08 | End: 2023-08-08

## 2023-08-08 RX ORDER — BACITRACIN ZINC 500 UNIT/G
OINTMENT (GRAM) TOPICAL 2 TIMES DAILY
Status: DISCONTINUED | OUTPATIENT
Start: 2023-08-08 | End: 2023-08-08

## 2023-08-08 RX ORDER — DIPHENHYDRAMINE HCL 25 MG
25 CAPSULE ORAL NIGHTLY PRN
Status: DISCONTINUED | OUTPATIENT
Start: 2023-08-08 | End: 2023-08-10 | Stop reason: HOSPADM

## 2023-08-08 RX ORDER — ONDANSETRON 8 MG/1
8 TABLET, ORALLY DISINTEGRATING ORAL EVERY 8 HOURS PRN
Status: DISCONTINUED | OUTPATIENT
Start: 2023-08-08 | End: 2023-08-10 | Stop reason: HOSPADM

## 2023-08-08 RX ORDER — MISOPROSTOL 200 UG/1
800 TABLET ORAL ONCE AS NEEDED
Status: DISCONTINUED | OUTPATIENT
Start: 2023-08-08 | End: 2023-08-10 | Stop reason: HOSPADM

## 2023-08-08 RX ORDER — TRANEXAMIC ACID 10 MG/ML
1000 INJECTION, SOLUTION INTRAVENOUS ONCE AS NEEDED
Status: DISCONTINUED | OUTPATIENT
Start: 2023-08-08 | End: 2023-08-08

## 2023-08-08 RX ORDER — IBUPROFEN 600 MG/1
600 TABLET ORAL EVERY 6 HOURS
Status: DISCONTINUED | OUTPATIENT
Start: 2023-08-08 | End: 2023-08-10 | Stop reason: HOSPADM

## 2023-08-08 RX ORDER — OXYTOCIN 10 [USP'U]/ML
10 INJECTION, SOLUTION INTRAMUSCULAR; INTRAVENOUS ONCE AS NEEDED
Status: DISCONTINUED | OUTPATIENT
Start: 2023-08-08 | End: 2023-08-10 | Stop reason: HOSPADM

## 2023-08-08 RX ORDER — DIPHENOXYLATE HYDROCHLORIDE AND ATROPINE SULFATE 2.5; .025 MG/1; MG/1
2 TABLET ORAL EVERY 6 HOURS PRN
Status: DISCONTINUED | OUTPATIENT
Start: 2023-08-08 | End: 2023-08-08

## 2023-08-08 RX ORDER — OXYTOCIN/RINGER'S LACTATE 30/500 ML
334 PLASTIC BAG, INJECTION (ML) INTRAVENOUS ONCE AS NEEDED
Status: DISCONTINUED | OUTPATIENT
Start: 2023-08-08 | End: 2023-08-08

## 2023-08-08 RX ORDER — METHYLERGONOVINE MALEATE 0.2 MG/ML
200 INJECTION INTRAVENOUS
Status: DISCONTINUED | OUTPATIENT
Start: 2023-08-08 | End: 2023-08-08

## 2023-08-08 RX ORDER — HYDROCORTISONE 25 MG/G
CREAM TOPICAL 3 TIMES DAILY PRN
Status: DISCONTINUED | OUTPATIENT
Start: 2023-08-08 | End: 2023-08-10 | Stop reason: HOSPADM

## 2023-08-08 RX ORDER — SIMETHICONE 80 MG
1 TABLET,CHEWABLE ORAL EVERY 6 HOURS PRN
Status: DISCONTINUED | OUTPATIENT
Start: 2023-08-08 | End: 2023-08-10 | Stop reason: HOSPADM

## 2023-08-08 RX ORDER — ONDANSETRON 8 MG/1
8 TABLET, ORALLY DISINTEGRATING ORAL EVERY 8 HOURS PRN
Status: DISCONTINUED | OUTPATIENT
Start: 2023-08-08 | End: 2023-08-08 | Stop reason: SDUPTHER

## 2023-08-08 RX ORDER — METHYLERGONOVINE MALEATE 0.2 MG/ML
200 INJECTION INTRAVENOUS
Status: DISCONTINUED | OUTPATIENT
Start: 2023-08-08 | End: 2023-08-10 | Stop reason: HOSPADM

## 2023-08-08 RX ORDER — CARBOPROST TROMETHAMINE 250 UG/ML
250 INJECTION, SOLUTION INTRAMUSCULAR
Status: DISCONTINUED | OUTPATIENT
Start: 2023-08-08 | End: 2023-08-10 | Stop reason: HOSPADM

## 2023-08-08 RX ORDER — OXYTOCIN 10 [USP'U]/ML
10 INJECTION, SOLUTION INTRAMUSCULAR; INTRAVENOUS ONCE AS NEEDED
Status: COMPLETED | OUTPATIENT
Start: 2023-08-08 | End: 2023-08-08

## 2023-08-08 RX ORDER — DOCUSATE SODIUM 100 MG/1
200 CAPSULE, LIQUID FILLED ORAL 2 TIMES DAILY PRN
Status: DISCONTINUED | OUTPATIENT
Start: 2023-08-08 | End: 2023-08-10 | Stop reason: HOSPADM

## 2023-08-08 RX ORDER — OXYTOCIN/RINGER'S LACTATE 30/500 ML
95 PLASTIC BAG, INJECTION (ML) INTRAVENOUS ONCE AS NEEDED
Status: DISCONTINUED | OUTPATIENT
Start: 2023-08-08 | End: 2023-08-08

## 2023-08-08 RX ORDER — PROCHLORPERAZINE EDISYLATE 5 MG/ML
5 INJECTION INTRAMUSCULAR; INTRAVENOUS EVERY 6 HOURS PRN
Status: DISCONTINUED | OUTPATIENT
Start: 2023-08-08 | End: 2023-08-08 | Stop reason: SDUPTHER

## 2023-08-08 RX ORDER — CARBOPROST TROMETHAMINE 250 UG/ML
250 INJECTION, SOLUTION INTRAMUSCULAR
Status: DISCONTINUED | OUTPATIENT
Start: 2023-08-08 | End: 2023-08-08

## 2023-08-08 RX ORDER — DIPHENOXYLATE HYDROCHLORIDE AND ATROPINE SULFATE 2.5; .025 MG/1; MG/1
2 TABLET ORAL EVERY 6 HOURS PRN
Status: DISCONTINUED | OUTPATIENT
Start: 2023-08-08 | End: 2023-08-10 | Stop reason: HOSPADM

## 2023-08-08 RX ORDER — TERBUTALINE SULFATE 1 MG/ML
0.25 INJECTION SUBCUTANEOUS
Status: DISCONTINUED | OUTPATIENT
Start: 2023-08-08 | End: 2023-08-08

## 2023-08-08 RX ORDER — PROCHLORPERAZINE EDISYLATE 5 MG/ML
5 INJECTION INTRAMUSCULAR; INTRAVENOUS EVERY 6 HOURS PRN
Status: DISCONTINUED | OUTPATIENT
Start: 2023-08-08 | End: 2023-08-08

## 2023-08-08 RX ADMIN — ACETAMINOPHEN 650 MG: 325 TABLET, FILM COATED ORAL at 08:08

## 2023-08-08 RX ADMIN — ACETAMINOPHEN 650 MG: 325 TABLET, FILM COATED ORAL at 07:08

## 2023-08-08 RX ADMIN — BACITRACIN ZINC: 500 OINTMENT TOPICAL at 08:08

## 2023-08-08 RX ADMIN — BACITRACIN ZINC: 500 OINTMENT TOPICAL at 01:08

## 2023-08-08 RX ADMIN — HYDROCORTISONE: 25 CREAM TOPICAL at 07:08

## 2023-08-08 RX ADMIN — IBUPROFEN 600 MG: 600 TABLET ORAL at 11:08

## 2023-08-08 RX ADMIN — DOCUSATE SODIUM 200 MG: 100 CAPSULE, LIQUID FILLED ORAL at 08:08

## 2023-08-08 RX ADMIN — PRENATAL VIT W/ FE FUMARATE-FA TAB 27-0.8 MG 1 TABLET: 27-0.8 TAB at 08:08

## 2023-08-08 RX ADMIN — OXYTOCIN 10 UNITS: 10 INJECTION, SOLUTION INTRAMUSCULAR; INTRAVENOUS at 03:08

## 2023-08-08 RX ADMIN — IBUPROFEN 600 MG: 600 TABLET ORAL at 05:08

## 2023-08-08 NOTE — H&P
[facility] - [facility] & Delivery  Obstetrics  History & Physical    Patient Name: Ron Cazares  MRN: [id number]  Admission Date: 2023  Primary Care Provider: Barbara, Primary Doctor    Subjective:     Principal Problem:Normal labor    History of Present Illness:  Ron Cazares is a 27 y.o. female  at 40w0d with Estimated Date of Delivery: 23 based on US at 31 weeks, who presented with c/o painful contractions for past 2 hours with urge to push. No lof/brvb, dysuria, fever/chills, or S&S of pre eclampsia. Good FM noted. + fhts 130s. She was found to be C/C/+2 in triage and transferred to labor and delivery for imminent delivery.     This pregnancy has been complicated by   Patient Active Problem List   Diagnosis    Pelvic floor tension    Coordination impairment    Vaginitis in pregnancy in third trimester    Pregnancy    Benign gestational thrombocytopenia in third trimester    Mixed hyperlipidemia    GBS (group B Streptococcus carrier), +RV culture, currently pregnant              OB History    Para Term  AB Living   2 2 2 0 0 2   SAB IAB Ectopic Multiple Live Births   0 0 0 0 2      # Outcome Date GA Lbr Zac/2nd Weight Sex Delivery Anes PTL Lv   2 Term 23 40w0d  3.24 kg (7 lb 2.3 oz) F Vag-Spont None N LINDA      Name: KEENA,GIRL RON      Apgar1: 8  Apgar5: 9   1 Term 18 40w5d 07:45 / 00:47 3.78 kg (8 lb 5.3 oz) M Vag-Spont None N LINDA      Name: Faheem      Apgar1: 6  Apgar5: 9     No past medical history on file.  Past Surgical History:   Procedure Laterality Date    NOSE SURGERY         PTA Medications   Medication Sig    EPINEPHrine (EPIPEN) 0.3 mg/0.3 mL AtIn Inject 0.3 mLs (0.3 mg total) into the muscle as needed.       Review of patient's allergies indicates:   Allergen Reactions    Flagyl [metronidazole] Anaphylaxis    Penicillins Swelling    Shellfish containing products         Family History       Problem Relation (Age of Onset)    Breast cancer Maternal  Grandmother    Miscarriages / Stillbirths Maternal Grandmother          Tobacco Use    Smoking status: Never    Smokeless tobacco: Never   Substance and Sexual Activity    Alcohol use: No    Drug use: No    Sexual activity: Yes     Partners: Male     Review of Systems   Constitutional:  Negative for activity change, chills, fever and unexpected weight change.   Eyes:  Negative for visual disturbance.   Respiratory:  Negative for cough and shortness of breath.    Cardiovascular:  Negative for chest pain.   Gastrointestinal:  Negative for abdominal pain, constipation, diarrhea, nausea and vomiting.   Genitourinary:  Negative for dysuria, genital sores, vaginal bleeding, vaginal discharge and vaginal odor.   Integumentary:  Negative for rash.   Neurological:  Negative for syncope and headaches.   Psychiatric/Behavioral:  Negative for depression.       Objective:     Vital Signs (Most Recent):  Temp: 98.4 °F (36.9 °C) (08/08/23 0348)  Pulse: 68 (08/08/23 0419)  Resp: 16 (08/08/23 0348)  BP: 106/68 (08/08/23 0419)  SpO2: 99 % (08/08/23 0348) Vital Signs (24h Range):  Temp:  [98.4 °F (36.9 °C)] 98.4 °F (36.9 °C)  Pulse:  [68-72] 68  Resp:  [16] 16  SpO2:  [99 %] 99 %  BP: ()/(62-68) 106/68        There is no height or weight on file to calculate BMI.    FHT: 130s upon arrival to room, fetal head + 3 station, urge to push.  Uterus soft between contractions.            Cervix:  Dilation:  10  Effacement:  100%  Station: 3  Presentation: Vertex     Significant Labs:  Lab Results   Component Value Date    GROUPTRH O POS 06/06/2023    HEPBSAG Negative 01/09/2023    STREPBCULT (A) 07/27/2023     STREPTOCOCCUS AGALACTIAE (GROUP B)  In case of Penicillin allergy, call lab for further testing.  Beta-hemolytic streptococci are routinely susceptible to   penicillins,cephalosporins and carbapenems.         I have personallly reviewed all pertinent lab results from the last 24 hours.  None    Assessment/Plan:     27 y.o.  female  at 40w0d for:    * Normal labor  Admit, routine labor admission orders.   Anticipate   OB ED MD and labor and delivery attending updated on status/admission and imminent delivery.    GBS (group B Streptococcus carrier), +RV culture, currently pregnant  IV antibiotics intrapartum not likely. PEDS to be notified of + GBS status, not treated.    Benign gestational thrombocytopenia in third trimester  CBC upon admit and PP Day #1      Vielka Natarajan CNM  Obstetrics  [facility] - Labor & Delivery

## 2023-08-08 NOTE — L&D DELIVERY NOTE
To room with strong urge to push/. Excellent maternal expulsive effort. OA to LOT. Shoulders and corpus followed easily with next push over intact perineum. Bulb suction then to maternal abdomen with warm towels. Infant girl/female. Cord clamp x 2 and cut. Cord blood sample obtained. Apgar Score of 8/9/, respectively.  of intact jewel placenta. Hemostasis achieved with gentle fundal massage and Pitocin IM.The cervix was  Mother and infant stable in room. . Routine postpartum care initiated.    Vielka Natarajan CNM

## 2023-08-08 NOTE — ED PROVIDER NOTES
Encounter Date: 2023       History     Chief Complaint   Patient presents with    Contractions     Amanda Cazares is a 27 y.o. female  at 40w0d with Estimated Date of Delivery: 23 based on US at 31 weeks who presents to Labor and Delivery accompanied by Mother. Expecting a girl!  Reports regular uterine contractions since for past 2 hours. They are now millicent  2-3 minutes apart and increasing in intensity and duration.  strong contractions, active fetal movement, No vaginal bleeding , No loss of fluid. No dysuria, fever/chills or S&S of pre eclampsia. Good FM noted.Appears uncomfortable with urge to push.    This pregnancy has been complicated by :  Patient Active Problem List:     Pelvic floor tension     Coordination impairment     Vaginitis in pregnancy in third trimester     Pregnancy     Benign gestational thrombocytopenia in third trimester     Mixed hyperlipidemia     GBS (group B Streptococcus carrier), +RV culture, currently pregnant     Birth Center Risk Assessment: 1-management on labor and Delivery    CNM management in ABC  CNM management on L&D  Consultation with OB to develop  plan of care  Collaborative CNM/OB management with delivery on L&D   4-   Permanent referral of care to MD          Review of patient's allergies indicates:   Allergen Reactions    Flagyl [metronidazole] Anaphylaxis    Penicillins Swelling    Shellfish containing products      No past medical history on file.  Past Surgical History:   Procedure Laterality Date    NOSE SURGERY       Family History   Problem Relation Age of Onset    Breast cancer Maternal Grandmother         Dx later in life    Miscarriages / Stillbirths Maternal Grandmother     Colon cancer Neg Hx      labor Neg Hx     Ovarian cancer Neg Hx     Melanoma Neg Hx      Social History     Tobacco Use    Smoking status: Never    Smokeless tobacco: Never   Substance Use Topics    Alcohol use: No    Drug use: No     Review of Systems    Gastrointestinal:  Positive for abdominal pain.   All other systems reviewed and are negative.      Physical Exam     Initial Vitals [23 0348]   BP Pulse Resp Temp SpO2   94/62 72 16 98.4 °F (36.9 °C) 99 %      MAP       --         Physical Exam    Vitals reviewed.  Constitutional: She appears well-developed and well-nourished. She appears distressed.   Abdominal: Abdomen is soft.   Genitourinary:    Vagina normal.      Genitourinary Comments: SVE C/C/+2, cephalic.   Fhts 130s, not on continous efm/toco       Neurological: She is alert and oriented to person, place, and time.   Psychiatric: She has a normal mood and affect.         ED Course   Procedures  Labs Reviewed - No data to display       Imaging Results    None          Medications - No data to display  Medical Decision Making:   History:   Old Records Summarized: records from clinic visits.  Initial Assessment:   IUP @ 40 weeks, active labor.   ED Management:  Admit, transfer to labor and delivery for imminent delivery.  Routine labor admission orders  Aware needs IV abx for + GBS, likely will delivery prior to treatment/notify PEDs postpartum.  OB ED MD aware of status/admission/plan.  Anticipate                           Clinical Impression:   Final diagnoses:  [O80]  (normal spontaneous vaginal delivery)               Vielka Natarajan, AMOL  23 [id number]

## 2023-08-08 NOTE — PLAN OF CARE
This patient has been screened for Social Work discharge planning needs. Based on  documentation in medical record , no discharge planning needs are anticipated at this time. Should any discharge planning needs arise, please consult .       23 [id number]   OB SCREEN   Assessment Type Discharge Planning Assessment   Source of Information health record   Received Prenatal Care Yes   Any indications/suspicions for None   Is this a teen pregnancy No   Is the baby in NICU No   Indication for adoption/Safe Haven No   Indication for DME/post-acute needs No   HIV (+) No   Any congenital  disorders No   Fetal demise/ death No     GENNA Mustafa  [phone]

## 2023-08-08 NOTE — ASSESSMENT & PLAN NOTE
Admit, routine labor admission orders.   Anticipate   OB ED MD and labor and delivery attending updated on status/admission and imminent delivery.

## 2023-08-08 NOTE — PLAN OF CARE
Problem:  Fall Injury Risk  Goal: Absence of Fall, Infant Drop and Related Injury  Outcome: Ongoing, Progressing     Problem: Adult Inpatient Plan of Care  Goal: Plan of Care Review  Outcome: Ongoing, Progressing  Goal: Patient-Specific Goal (Individualized)  Outcome: Ongoing, Progressing  Goal: Absence of Hospital-Acquired Illness or Injury  Outcome: Ongoing, Progressing  Goal: Optimal Comfort and Wellbeing  Outcome: Ongoing, Progressing  Goal: Readiness for Transition of Care  Outcome: Ongoing, Progressing     Problem: Breastfeeding  Goal: Effective Breastfeeding  Outcome: Ongoing, Progressing     Problem: Pain Acute  Goal: Acceptable Pain Control and Functional Ability  Outcome: Ongoing, Progressing     Problem: Bleeding (Labor)  Goal: Hemostasis  Outcome: Ongoing, Progressing     Problem: Change in Fetal Wellbeing (Labor)  Goal: Stable Fetal Wellbeing  Outcome: Ongoing, Progressing     Problem: Delayed Labor Progression (Labor)  Goal: Effective Progression to Delivery  Outcome: Ongoing, Progressing     Problem: Infection (Labor)  Goal: Absence of Infection Signs and Symptoms  Outcome: Ongoing, Progressing     Problem: Labor Pain (Labor)  Goal: Acceptable Pain Control  Outcome: Ongoing, Progressing     Problem: Uterine Tachysystole (Labor)  Goal: Normal Uterine Contraction Pattern  Outcome: Ongoing, Progressing

## 2023-08-08 NOTE — SUBJECTIVE & OBJECTIVE
OB History    Para Term  AB Living   2 2 2 0 0 2   SAB IAB Ectopic Multiple Live Births   0 0 0 0 2      # Outcome Date GA Lbr Zac/2nd Weight Sex Delivery Anes PTL Lv   2 Term 23 40w0d  3.24 kg (7 lb 2.3 oz) F Vag-Spont None N LINDA      Name: KEENA,GIRL RON      Apgar1: 8  Apgar5: 9   1 Term 18 40w5d 07:45 / 00:47 3.78 kg (8 lb 5.3 oz) M Vag-Spont None N LINDA      Name: Faheem      Apgar1: 6  Apgar5: 9     No past medical history on file.  Past Surgical History:   Procedure Laterality Date    NOSE SURGERY         PTA Medications   Medication Sig    EPINEPHrine (EPIPEN) 0.3 mg/0.3 mL AtIn Inject 0.3 mLs (0.3 mg total) into the muscle as needed.       Review of patient's allergies indicates:   Allergen Reactions    Flagyl [metronidazole] Anaphylaxis    Penicillins Swelling    Shellfish containing products         Family History       Problem Relation (Age of Onset)    Breast cancer Maternal Grandmother    Miscarriages / Stillbirths Maternal Grandmother          Tobacco Use    Smoking status: Never    Smokeless tobacco: Never   Substance and Sexual Activity    Alcohol use: No    Drug use: No    Sexual activity: Yes     Partners: Male     Review of Systems   Constitutional:  Negative for activity change, chills, fever and unexpected weight change.   Eyes:  Negative for visual disturbance.   Respiratory:  Negative for cough and shortness of breath.    Cardiovascular:  Negative for chest pain.   Gastrointestinal:  Negative for abdominal pain, constipation, diarrhea, nausea and vomiting.   Genitourinary:  Negative for dysuria, genital sores, vaginal bleeding, vaginal discharge and vaginal odor.   Integumentary:  Negative for rash.   Neurological:  Negative for syncope and headaches.   Psychiatric/Behavioral:  Negative for depression.       Objective:     Vital Signs (Most Recent):  Temp: 98.4 °F (36.9 °C) (23 034)  Pulse: 68 (23 0419)  Resp: 16 (23 034)  BP: 106/68 (23  0189)  SpO2: 99 % (08/08/23 0348) Vital Signs (24h Range):  Temp:  [98.4 °F (36.9 °C)] 98.4 °F (36.9 °C)  Pulse:  [68-72] 68  Resp:  [16] 16  SpO2:  [99 %] 99 %  BP: ()/(62-68) 106/68        There is no height or weight on file to calculate BMI.    FHT: 130s upon arrival to room, fetal head + 3 station, urge to push.  Uterus soft between contractions.            Cervix:  Dilation:  10  Effacement:  100%  Station: 3  Presentation: Vertex     Significant Labs:  Lab Results   Component Value Date    GROUPTRH O POS 06/06/2023    HEPBSAG Negative 01/09/2023    STREPBCULT (A) 07/27/2023     STREPTOCOCCUS AGALACTIAE (GROUP B)  In case of Penicillin allergy, call lab for further testing.  Beta-hemolytic streptococci are routinely susceptible to   penicillins,cephalosporins and carbapenems.         I have personallly reviewed all pertinent lab results from the last 24 hours.  None

## 2023-08-08 NOTE — HPI
Amanda Cazares is a 27 y.o. female  at 40w0d with Estimated Date of Delivery: 23 based on US at 31 weeks, who presented with c/o painful contractions for past 2 hours with urge to push. Uncomplicated, precipitous  of live infant girl over intact perineum. + GBS untreated. PP day # 2, stable and ready for discharge to home.

## 2023-08-09 PROBLEM — Z37.9 NORMAL LABOR: Status: RESOLVED | Noted: 2018-06-14 | Resolved: 2023-08-09

## 2023-08-09 LAB
BASOPHILS # BLD AUTO: 0.04 K/UL (ref 0–0.2)
BASOPHILS NFR BLD: 0.5 % (ref 0–1.9)
DIFFERENTIAL METHOD: ABNORMAL
EOSINOPHIL # BLD AUTO: 0.1 K/UL (ref 0–0.5)
EOSINOPHIL NFR BLD: 0.9 % (ref 0–8)
ERYTHROCYTE [DISTWIDTH] IN BLOOD BY AUTOMATED COUNT: 13.6 % (ref 11.5–14.5)
HCT VFR BLD AUTO: 27.7 % (ref 37–48.5)
HGB BLD-MCNC: 9.3 G/DL (ref 12–16)
IMM GRANULOCYTES # BLD AUTO: 0.03 K/UL (ref 0–0.04)
IMM GRANULOCYTES NFR BLD AUTO: 0.4 % (ref 0–0.5)
LYMPHOCYTES # BLD AUTO: 3.3 K/UL (ref 1–4.8)
LYMPHOCYTES NFR BLD: 39.6 % (ref 18–48)
MCH RBC QN AUTO: 30.8 PG (ref 27–31)
MCHC RBC AUTO-ENTMCNC: 33.6 G/DL (ref 32–36)
MCV RBC AUTO: 92 FL (ref 82–98)
MONOCYTES # BLD AUTO: 0.4 K/UL (ref 0.3–1)
MONOCYTES NFR BLD: 4.8 % (ref 4–15)
NEUTROPHILS # BLD AUTO: 4.4 K/UL (ref 1.8–7.7)
NEUTROPHILS NFR BLD: 53.8 % (ref 38–73)
NRBC BLD-RTO: 0 /100 WBC
PLATELET # BLD AUTO: 109 K/UL (ref 150–450)
PMV BLD AUTO: 12.2 FL (ref 9.2–12.9)
RBC # BLD AUTO: 3.02 M/UL (ref 4–5.4)
WBC # BLD AUTO: 8.2 K/UL (ref 3.9–12.7)

## 2023-08-09 PROCEDURE — 99233 SBSQ HOSP IP/OBS HIGH 50: CPT | Mod: ,,,

## 2023-08-09 PROCEDURE — 36415 COLL VENOUS BLD VENIPUNCTURE: CPT | Performed by: ADVANCED PRACTICE MIDWIFE

## 2023-08-09 PROCEDURE — 99233 PR SUBSEQUENT HOSPITAL CARE,LEVL III: ICD-10-PCS | Mod: ,,,

## 2023-08-09 PROCEDURE — 85025 COMPLETE CBC W/AUTO DIFF WBC: CPT | Performed by: ADVANCED PRACTICE MIDWIFE

## 2023-08-09 PROCEDURE — 11000001 HC ACUTE MED/SURG PRIVATE ROOM

## 2023-08-09 PROCEDURE — 25000003 PHARM REV CODE 250: Performed by: ADVANCED PRACTICE MIDWIFE

## 2023-08-09 RX ADMIN — DOCUSATE SODIUM 200 MG: 100 CAPSULE, LIQUID FILLED ORAL at 07:08

## 2023-08-09 RX ADMIN — BACITRACIN ZINC: 500 OINTMENT TOPICAL at 11:08

## 2023-08-09 RX ADMIN — IBUPROFEN 600 MG: 600 TABLET ORAL at 12:08

## 2023-08-09 RX ADMIN — IBUPROFEN 600 MG: 600 TABLET ORAL at 11:08

## 2023-08-09 RX ADMIN — ACETAMINOPHEN 650 MG: 325 TABLET, FILM COATED ORAL at 02:08

## 2023-08-09 RX ADMIN — IBUPROFEN 600 MG: 600 TABLET ORAL at 06:08

## 2023-08-09 RX ADMIN — PRENATAL VIT W/ FE FUMARATE-FA TAB 27-0.8 MG 1 TABLET: 27-0.8 TAB at 07:08

## 2023-08-09 RX ADMIN — BACITRACIN ZINC: 500 OINTMENT TOPICAL at 07:08

## 2023-08-09 NOTE — ASSESSMENT & PLAN NOTE
8/9/23 PPD1, Doing well, normal lochia, pain controlled by PO meds. Breastfeeding is going well. Normal involution. Feeling mild fatigue and dizziness, H/H  9.3/27.7. Desires discharge tomorrow.

## 2023-08-09 NOTE — LACTATION NOTE
"   08/09/23 1341   Maternal Assessment   Breast Shape Bilateral:;round   Breast Density soft   Areola elastic   Nipples everted   Left Nipple Symptoms tender;redness   Right Nipple Symptoms tender;redness   Maternal Infant Feeding   Maternal Emotional State assist needed   Infant Positioning side-lying   Signs of Milk Transfer infant jaw motion present   Pain with Feeding yes   Pain Location nipples, bilateral   Pain Description sharp   Comfort Measures Before/During Feeding infant position adjusted   Latch Assistance yes  (minimal)     Pt feeding in side lying position when LC entered room. Pt explained that she feeds for ten minutes then switches to other side due to painful latch. Pt explained she continues to rotate until baby is content. LC discussed the benefits of utilizing techniques and positions to obtain deeper latch for newborns. With permission, LC assisted with compressions and stimulated baby while at the breast. Baby engaging in extending pausing during feeding. LC reviewed signs of contentment. " Oh! She let's me know when she is not content."  encouraged Pt to use breast compression with stimulation to keep baby actively feeding and hand express and spoon feed afterwards.  Education on use and maintenance of hydrogels provided. LC placed contact information on board and encouraged Pt to call for assistance.   "

## 2023-08-09 NOTE — LACTATION NOTE
Situation: initiated lactation consult    Background: day 1 postpartum, with previous full term breastfeeding experience ( 12 months).     Assessment:   Mom-  Reports infant nursing well. Denies pain with latch, hearing swallows.     Pt reports she took Unisom during pregnancy for sleep and is wanting something postpartum. Unisom is L3 (no evidence) but strong likelihood to enter milk and both [facility] and [facility]lact[facility] recommends an alternative: benadryl. We discussed theoretical/anecdotal evidence of low milk supply with Benadryl but no strong evidence in the research to support this. After a thorough discussion she would like to use Benadryl instead of Unisom for the time being and will monitor for lower than expected levels of milk and also understands importance of having another responsible adult around at night while using a sleep aid. RN and CNM notified.     Actions:   1.  Reviewed basics of breastfeeding and managing breastfeeding difficulties  2.LC reviewed Breastfeeding Guide and encouraged tracking feeds and output. Encouraged use of STS, frequent feeds on demand, and avoiding artificial nipples.       Results: Pt agrees to the plan of feeding. V/U and questions answered.

## 2023-08-09 NOTE — PROGRESS NOTES
[facility]  Obstetrics  Postpartum Progress Note    Patient Name: Amanda Cazares  MRN: [id number]  Admission Date: 2023  Hospital Length of Stay: 1 days  Attending Physician: Aman Drew MD  Primary Care Provider: Barbara, Primary Doctor    Subjective:     Principal Problem: (normal spontaneous vaginal delivery)    Hospital Course:  Admit, second stage labor  Routine labor/delivery orders  Intermittent EFM/TOCO  + GBS-delivery imminent, PEDS to be notified of +GBS status, intrapartum treatment with IV antibiotics not likely.  Anticipate     23 PPD1, Doing well, normal lochia, pain controlled by PO meds. Breastfeeding is going well. Normal involution. Feeling mild fatigue and dizziness, H/H  9.3/27.7. Desires discharge tomorrow.      Interval History: PPD1    She is doing well this morning. She is tolerating a regular diet without nausea or vomiting. She is voiding spontaneously. She is ambulating. She has passed flatus, and has not a BM. Vaginal bleeding is mild. She denies fever or chills. Abdominal pain is mild and controlled with oral medications. She Is breastfeeding. She desires circumcision for her male baby: not applicable.    Objective:     Vital Signs (Most Recent):  Temp: 98.2 °F (36.8 °C) (23)  Pulse: 61 (23)  Resp: 18 (23)  BP: (!) 92/55 (23)  SpO2: 97 % (23) Vital Signs (24h Range):  Temp:  [97.8 °F (36.6 °C)-98.3 °F (36.8 °C)] 98.2 °F (36.8 °C)  Pulse:  [61-86] 61  Resp:  [18] 18  SpO2:  [95 %-97 %] 97 %  BP: ()/(55-63) 92/55        There is no height or weight on file to calculate BMI.      Intake/Output Summary (Last 24 hours) at 2023 1131  Last data filed at 2023 1140  Gross per 24 hour   Intake --   Output 400 ml   Net -400 ml         Significant Labs:  Lab Results   Component Value Date    GROUPTRH O POS 2023    HEPBSAG Negative 2023    STREPBCULT (A) 2023     STREPTOCOCCUS  AGALACTIAE (GROUP B)  In case of Penicillin allergy, call lab for further testing.  Beta-hemolytic streptococci are routinely susceptible to   penicillins,cephalosporins and carbapenems.       Recent Labs   Lab 23  0615   HGB 9.3*   HCT 27.7*       I have personallly reviewed all pertinent lab results from the last 24 hours.    Physical Exam:   Constitutional: She is oriented to person, place, and time. She appears well-developed and well-nourished.    HENT:   Head: Normocephalic and atraumatic.    Eyes: Conjunctivae are normal.     Cardiovascular:  Normal rate, regular rhythm and normal heart sounds.             Pulmonary/Chest: Effort normal and breath sounds normal.        Abdominal: Soft. Bowel sounds are normal. There is no abdominal tenderness.     Genitourinary: There is vaginal discharge (lochia) in the vagina.           Musculoskeletal: Normal range of motion.       Neurological: She is alert and oriented to person, place, and time.    Skin: Skin is warm and dry.    Psychiatric: She has a normal mood and affect. Her behavior is normal. Judgment and thought content normal.       Review of Systems   Constitutional:  Positive for fatigue. Negative for chills and fever.   Eyes: Negative.    Respiratory: Negative.     Cardiovascular: Negative.    Gastrointestinal:  Positive for abdominal pain. Negative for nausea and vomiting.   Endocrine: Negative.    Genitourinary:  Positive for vaginal bleeding. Negative for vaginal odor.   Musculoskeletal: Negative.    Integumentary:  Negative.   Neurological: Negative.    Hematological: Negative.    Psychiatric/Behavioral: Negative.     Breast: negative.        Assessment/Plan:     27 y.o. female  for:    *  (normal spontaneous vaginal delivery)  23 PPD1, Doing well, normal lochia, pain controlled by PO meds. Breastfeeding is going well. Normal involution. Feeling mild fatigue and dizziness, H/H  9.3/27.7. Desires discharge tomorrow.    GBS (group B  Streptococcus carrier), +RV culture, currently pregnant  IV antibiotics intrapartum not likely. PEDS to be notified of + GBS status, not treated.    Benign gestational thrombocytopenia in third trimester  CBC upon admit and PP Day #1        Disposition: As patient meets milestones, will plan to discharge tomorrow.    Clarisse Braxton CNM  Obstetrics  [facility] ([place])

## 2023-08-09 NOTE — PLAN OF CARE
VSS. Patient ambulating and voiding independently and without difficulty. Fundus firm without massage, midline, with moderate rubra noted. Pain controlled without PRN pain medications. Safety maintained, bed low and in a locked position. Family at bedside. Breastfeeding every 2-3 hours with minimal assistance and in response to infant cues. No further concerns at this time, will continue to monitor.

## 2023-08-09 NOTE — PLAN OF CARE
VSS. Pain controlled with scheduled and PRN oral pain medication. Patient breast feeding baby. Fundus firm and midline with light to moderate lochia rubra. Voiding spontaneously with adequate output. Passing gas. No concerns at this time. Repeat labs this morning. Will continue to monitor patient and intervene as necessary.            Problem:  Fall Injury Risk  Goal: Absence of Fall, Infant Drop and Related Injury  Outcome: Ongoing, Progressing     Problem: Adult Inpatient Plan of Care  Goal: Plan of Care Review  Outcome: Ongoing, Progressing  Goal: Patient-Specific Goal (Individualized)  Outcome: Ongoing, Progressing  Goal: Absence of Hospital-Acquired Illness or Injury  Outcome: Ongoing, Progressing  Goal: Optimal Comfort and Wellbeing  Outcome: Ongoing, Progressing  Goal: Readiness for Transition of Care  Outcome: Ongoing, Progressing     Problem: Breastfeeding  Goal: Effective Breastfeeding  Outcome: Ongoing, Progressing     Problem: Pain Acute  Goal: Acceptable Pain Control and Functional Ability  Outcome: Ongoing, Progressing     Problem: Adjustment to Role Transition (Postpartum Vaginal Delivery)  Goal: Successful Maternal Role Transition  Outcome: Ongoing, Progressing     Problem: Bleeding (Postpartum Vaginal Delivery)  Goal: Hemostasis  Outcome: Ongoing, Progressing     Problem: Infection (Postpartum Vaginal Delivery)  Goal: Absence of Infection Signs/Symptoms  Outcome: Ongoing, Progressing     Problem: Pain (Postpartum Vaginal Delivery)  Goal: Acceptable Pain Control  Outcome: Ongoing, Progressing     Problem: Urinary Retention (Postpartum Vaginal Delivery)  Goal: Effective Urinary Elimination  Outcome: Ongoing, Progressing

## 2023-08-10 VITALS
SYSTOLIC BLOOD PRESSURE: 107 MMHG | RESPIRATION RATE: 18 BRPM | OXYGEN SATURATION: 97 % | TEMPERATURE: 99 F | DIASTOLIC BLOOD PRESSURE: 58 MMHG | HEART RATE: 63 BPM

## 2023-08-10 PROCEDURE — 99238 PR HOSPITAL DISCHARGE DAY,<30 MIN: ICD-10-PCS | Mod: ,,,

## 2023-08-10 PROCEDURE — 25000003 PHARM REV CODE 250: Performed by: ADVANCED PRACTICE MIDWIFE

## 2023-08-10 PROCEDURE — 99238 HOSP IP/OBS DSCHRG MGMT 30/<: CPT | Mod: ,,,

## 2023-08-10 RX ORDER — IBUPROFEN 600 MG/1
600 TABLET ORAL EVERY 8 HOURS PRN
Qty: 30 TABLET | Refills: 0 | Status: SHIPPED | OUTPATIENT
Start: 2023-08-10

## 2023-08-10 RX ADMIN — IBUPROFEN 600 MG: 600 TABLET ORAL at 11:08

## 2023-08-10 RX ADMIN — PRENATAL VIT W/ FE FUMARATE-FA TAB 27-0.8 MG 1 TABLET: 27-0.8 TAB at 09:08

## 2023-08-10 RX ADMIN — DOCUSATE SODIUM 200 MG: 100 CAPSULE, LIQUID FILLED ORAL at 09:08

## 2023-08-10 RX ADMIN — BACITRACIN ZINC: 500 OINTMENT TOPICAL at 09:08

## 2023-08-10 RX ADMIN — IBUPROFEN 600 MG: 600 TABLET ORAL at 05:08

## 2023-08-10 NOTE — LACTATION NOTE
Lactation discharge education completed. Plan of care is for pt to follow basic breastfeeding education, frequent feeding based on baby's cues, and to monitor baby's voids and stools. Breastfeeding guide, including First Alert survey, resource list, and lactation warmline phone number reviewed. LC discussed nutritive versus non-nutritive feedings. Pt aware to pump if baby is non-nutritive at the breast. Pt to notify doctor for maternal or infant concerns, as reviewed with LC. Pt verbalizes understanding and questions answered.

## 2023-08-10 NOTE — DISCHARGE SUMMARY
[facility] ([place])  Obstetrics  Discharge Summary      Patient Name: Amanda Cazares  MRN: [id number]  Admission Date: 2023  Hospital Length of Stay: 2 days  Discharge Date and Time: 8/10/23  Attending Physician: Aamn Drew MD   Discharging Provider: Evelyne Stephenson CNM   Primary Care Provider: Barbara, Primary Doctor    HPI: Amanda Cazares is a 27 y.o. female  at 40w0d with Estimated Date of Delivery: 23 based on US at 31 weeks, who presented with c/o painful contractions for past 2 hours with urge to push. Uncomplicated, precipitous  of live infant girl over intact perineum. + GBS untreated. PP day # 2, stable and ready for discharge to home.          * No surgery found *     Hospital Course:   PP day #2, doing well. Stable and ready for discharge to home.           Final Active Diagnoses:    Diagnosis Date Noted POA    PRINCIPAL PROBLEM:   (normal spontaneous vaginal delivery) [O80] 2023 Not Applicable    GBS (group B Streptococcus carrier), +RV culture, currently pregnant [O99.820] 2023 Not Applicable    Benign gestational thrombocytopenia in third trimester [O99.113, D69.6] 2023 Yes      Problems Resolved During this Admission:    Diagnosis Date Noted Date Resolved POA    Normal labor [O80, Z37.9] 2018 Not Applicable        Significant Diagnostic Studies: Labs: All labs within the past 24 hours have been reviewed      Feeding Method: breast    Immunizations     Date Immunization Status Dose Route/Site Given by    21 0000 COVID-19, MRNA, LN-S, PF (Pfizer) (Purple Cap) Given  Intramuscular/Right arm     21 0000 COVID-19, MRNA, LN-S, PF (Pfizer) (Purple Cap) Given  Intramuscular/Left arm     23 0549 MMR Incomplete 0.5 mL Subcutaneous/     23 0549 Tdap Incomplete 0.5 mL Intramuscular/           Delivery:    Episiotomy: None   Lacerations: None   Repair suture: None   Repair # of packets:     Blood loss (ml):        Birth information:  YOB: 2024   Time of birth: 3:27 AM   Sex: female   Delivery type: Vaginal, Spontaneous   Gestational Age: 40w0d    Delivery Clinician:      Other providers:       Additional  information:  Forceps:    Vacuum:    Breech:    Observed anomalies      Living?:           APGARS  One minute Five minutes Ten minutes   Skin color:         Heart rate:         Grimace:         Muscle tone:         Breathing:         Totals: 8  9        Placenta: Delivered:       appearance    Pending Diagnostic Studies:     None          Discharged Condition: stable    Disposition: Home or Self Care    Follow Up:   Follow-up Information     [facility] Follow up in 6 week(s).    Specialty: Obstetrics and Gynecology  Why: Postpartum visit  Contact information:  [address] [address] [phone]  Additional information:  [facility]) 4th Floor   Please park in [address] and use [place] elevators           [facility] Follow up in 2 week(s).    Specialty: Obstetrics and Gynecology  Why: mood check  Contact information:  [address] [address] [phone]  Additional information:  [facility]) 4th Floor   Please park in [address] and use [place] elevators                     Patient Instructions:      Diet Adult Regular     No driving until:   Order Comments: 1-2 weeks postpartum     Pelvic Rest   Order Comments: 6 weeks postpartum     Notify your health care provider if you experience any of the following:  temperature >100.4     Notify your health care provider if you experience any of the following:  severe uncontrolled pain     Notify your health care provider if you experience any of the following:  redness, tenderness, or signs of infection (pain, swelling, redness, odor or green/yellow discharge around  incision site)     Notify your health care provider if you experience any of the following:  difficulty breathing or increased cough     Notify your health care provider if you experience any of the following:  persistent dizziness, light-headedness, or visual disturbances     Activity as tolerated     Medications:  Current Discharge Medication List      START taking these medications    Details   ibuprofen (ADVIL,MOTRIN) 600 MG tablet Take 1 tablet (600 mg total) by mouth every 8 (eight) hours as needed for Pain.  Qty: 30 tablet, Refills: 0         CONTINUE these medications which have NOT CHANGED    Details   EPINEPHrine (EPIPEN) 0.3 mg/0.3 mL AtIn Inject 0.3 mLs (0.3 mg total) into the muscle as needed.  Qty: 1 each, Refills: 1             Evelyne Stephenson CNM  Obstetrics  [facility] ([place])

## 2023-08-10 NOTE — PROGRESS NOTES
[facility] ([place])  Obstetrics  Postpartum Progress Note    Patient Name: Amanda Cazares  MRN: [id number]  Admission Date: 2023  Hospital Length of Stay: 2 days  Attending Physician: Aman Drew MD  Primary Care Provider: Barbara, Primary Doctor    Subjective:     Principal Problem: (normal spontaneous vaginal delivery)    Hospital Course:  PP day #2, doing well. Stable and ready for discharge to home.      Doing well, ambulating, voiding, and tolerating regular diet  Lochia: steadily decreasing  Pain: well controlled with PO NSAIDs.  Breasts/nipples: intact. feeding well without difficulty.  Normal bladder/bowel habits.  Depression/anxiety: History of depression.  Support at home: yes  Contraception: considering Unsure; understands that progesterone only options are appropriate with breastfeeding  [place]:  is doing well, will f/u with pediatrician     Objective:     Vital Signs (Most Recent):  Temp: 98.5 °F (36.9 °C) (08/10/23 0823)  Pulse: 63 (08/10/23 0823)  Resp: 18 (08/10/23 0823)  BP: (!) 107/58 (08/10/23 0823)  SpO2: 97 % (08/10/23 0823) Vital Signs (24h Range):  Temp:  [98.1 °F (36.7 °C)-99.4 °F (37.4 °C)] 98.5 °F (36.9 °C)  Pulse:  [62-75] 63  Resp:  [17-18] 18  SpO2:  [97 %] 97 %  BP: ()/(52-58) 107/58     Gen: A&O x 4, NAD  CV: normal HR  Lungs: normal resp effort  Breasts: bilaterally soft, non-tender, nipples intact bilat  Abdomen: soft, non-tender, uterus firm at U - 2 fb  Perineum: intact  Lochia: minimal rubra  Ext: bilaterally no pedal edema, without signs of DVT     There is no height or weight on file to calculate BMI.    No intake or output data in the 24 hours ending 08/10/23 0929      Significant Labs:  Lab Results   Component Value Date    GROUPTRH O POS 2023    HEPBSAG Negative 2023    STREPBCULT (A) 2023     STREPTOCOCCUS AGALACTIAE (GROUP B)  In case of Penicillin allergy, call lab for further testing.  Beta-hemolytic streptococci are  routinely susceptible to   penicillins,cephalosporins and carbapenems.       Recent Labs   Lab 23  0615   HGB 9.3*   HCT 27.7*       I have personallly reviewed all pertinent lab results from the last 24 hours.  Recent Lab Results       None          Assessment/Plan:     27 y.o. female  for:    *  (normal spontaneous vaginal delivery)  23 PPD1, Doing well, normal lochia, pain controlled by PO meds. Breastfeeding is going well. Normal involution. Feeling mild fatigue and dizziness, H/H  9.3/27.7. Desires discharge tomorrow.    GBS (group B Streptococcus carrier), +RV culture, currently pregnant  Precipitous , not treated for + GBS.    Benign gestational thrombocytopenia in third trimester   postpartum. Normal lochia. Consider repeat CBC at PP visit.        Disposition:   1.Discharge to home today with infant.   2. RX for Motrin to pharmacy.  3. See discharge summary.  4. F/U 2 weeks for mood check.  5. F/U 4-6 weeks for postpartum appointment or sooner, prn. Repeat CBC at PP appointment.    Vielka Natarajan CNM  Obstetrics  [facility] ([place])

## 2023-08-10 NOTE — PLAN OF CARE
VSS. Pain controlled with scheduled oral pain medication. Breastfeeding. Pt states that she passed 2 small blood clots while urinating, fundus firm and midline with moderate lochia rubra. Pt to call if more blood clots are passed (larger than a golf ball) or if bleeding increases.  Voiding spontaneously with adequate output. Passing gas. Discharge orders in per CNM. Discharge instructions and s/s of when to return to [place] reviewed, understanding verbalized. Pt to follow up at [facility] in 2/6 weeks. No concerns at this time.

## 2023-08-10 NOTE — PLAN OF CARE
08/10/23 1124   Final Note   Assessment Type Final Discharge Note   Anticipated Discharge Disposition Home   Post-Acute Status   Discharge Delays None known at this time

## 2023-08-10 NOTE — PROGRESS NOTES
[facility] ([place])  Obstetrics  Postpartum Progress Note    Patient Name: Amanda Cazares  MRN: [id number]  Admission Date: 2023  Hospital Length of Stay: 2 days  Attending Physician: Aman Drew MD  Primary Care Provider: Barbara, Primary Doctor    Subjective:     Principal Problem: (normal spontaneous vaginal delivery)    Hospital Course:  PP day #2, doing well. Stable and ready for discharge to home.      Doing well, ambulating, voiding, and tolerating regular diet  Lochia: steadily decreasing  Pain: well controlled with requiring PO NSAID medication  Breasts/nipples: intact feeding well without difficulty; PRN lactation  Depression/anxiety: history of depression   Support at home: yes  Contraception: unsure; understands that progesterone only options are appropriate with breastfeeding  : is doing well, will f/u with pediatrician     Gen: A&O x 4, NAD  CV: normal HR  Lungs: normal resp effort  Breasts: bilaterally soft, non-tender, nipples intact  Abdomen: soft, non-tender, uterus firm at U - 2 fb  Perineum: approximated, no edema   Lochia: minimal rubra  Ext: bilaterally no pedal edema, without signs of DVT    Objective:     Vital Signs (Most Recent):  Temp: 98.5 °F (36.9 °C) (08/10/23 0823)  Pulse: 63 (08/10/23 0823)  Resp: 18 (08/10/23 0823)  BP: (!) 107/58 (08/10/23 0823)  SpO2: 97 % (08/10/23 0823) Vital Signs (24h Range):  Temp:  [98.1 °F (36.7 °C)-99.4 °F (37.4 °C)] 98.5 °F (36.9 °C)  Pulse:  [62-75] 63  Resp:  [17-18] 18  SpO2:  [97 %] 97 %  BP: ()/(52-58) 107/58        There is no height or weight on file to calculate BMI.    No intake or output data in the 24 hours ending 08/10/23 0934      Significant Labs:  Lab Results   Component Value Date    GROUPTRH O POS 2023    HEPBSAG Negative 2023    STREPBCULT (A) 2023     STREPTOCOCCUS AGALACTIAE (GROUP B)  In case of Penicillin allergy, call lab for further testing.  Beta-hemolytic streptococci are  routinely susceptible to   penicillins,cephalosporins and carbapenems.       Recent Labs   Lab 23  0615   HGB 9.3*   HCT 27.7*       I have personallly reviewed all pertinent lab results from the last 24 hours.  Recent Lab Results       None          Assessment/Plan:     27 y.o. female  for:    *  (normal spontaneous vaginal delivery)  23 PPD1, Doing well, normal lochia, pain controlled by PO meds. Breastfeeding is going well. Normal involution. Feeling mild fatigue and dizziness, H/H  9.3/27.7. Desires discharge tomorrow.  PP day #2, doing well. Stable and ready for discharge to home.    GBS (group B Streptococcus carrier), +RV culture, currently pregnant  Precipitous , not treated for + GBS.    Benign gestational thrombocytopenia in third trimester   postpartum. Normal lochia. Consider repeat CBC at PP visit.        Disposition: As patient meets milestones, will plan to discharge today on PPD#2.    Evelyne Stephenson CNM  Obstetrics  [facility] ([place])

## 2023-08-10 NOTE — ASSESSMENT & PLAN NOTE
8/9/23 PPD1, Doing well, normal lochia, pain controlled by PO meds. Breastfeeding is going well. Normal involution. Feeling mild fatigue and dizziness, H/H  9.3/27.7. Desires discharge tomorrow.  PP day #2, doing well. Stable and ready for discharge to home.

## 2023-08-10 NOTE — SUBJECTIVE & OBJECTIVE
Doing well, ambulating, voiding, and tolerating regular diet  Lochia: steadily decreasing  Pain: well controlled with PO NSAIDs.  Breasts/nipples: intact. feeding well without difficulty.  Normal bladder/bowel habits.  Depression/anxiety: History of depression.  Support at home: yes  Contraception: considering Unsure; understands that progesterone only options are appropriate with breastfeeding  [place]:  is doing well, will f/u with pediatrician     Gen: A&O x 4, NAD  CV: normal HR  Lungs: normal resp effort  Breasts: bilaterally soft, non-tender, nipples intact bilat  Abdomen: soft, non-tender, uterus firm at U - 2 fb  Perineum: intact  Lochia: minimal rubra  Ext: bilaterally no pedal edema, without signs of DVT       Objective:     Vital Signs (Most Recent):  Temp: 98.5 °F (36.9 °C) (08/10/23 0823)  Pulse: 63 (08/10/23 0823)  Resp: 18 (08/10/23 0823)  BP: (!) 107/58 (08/10/23 0823)  SpO2: 97 % (08/10/23 0823) Vital Signs (24h Range):  Temp:  [98.1 °F (36.7 °C)-99.4 °F (37.4 °C)] 98.5 °F (36.9 °C)  Pulse:  [62-75] 63  Resp:  [17-18] 18  SpO2:  [97 %] 97 %  BP: ()/(52-58) 107/58        There is no height or weight on file to calculate BMI.    No intake or output data in the 24 hours ending 08/10/23 0929      Significant Labs:  Lab Results   Component Value Date    GROUPTRH O POS 2023    HEPBSAG Negative 2023    STREPBCULT (A) 2023     STREPTOCOCCUS AGALACTIAE (GROUP B)  In case of Penicillin allergy, call lab for further testing.  Beta-hemolytic streptococci are routinely susceptible to   penicillins,cephalosporins and carbapenems.       Recent Labs   Lab 23   HGB 9.3*   HCT 27.7*       I have personallly reviewed all pertinent lab results from the last 24 hours.  Recent Lab Results       None

## 2023-08-10 NOTE — SUBJECTIVE & OBJECTIVE
Doing well, ambulating, voiding, and tolerating regular diet  Lochia: steadily decreasing  Pain: well controlled with requiring PO NSAID medication  Breasts/nipples: intact feeding well without difficulty; PRN lactation  Depression/anxiety: history of depression   Support at home: yes  Contraception: unsure; understands that progesterone only options are appropriate with breastfeeding  : is doing well, will f/u with pediatrician     Gen: A&O x 4, NAD  CV: normal HR  Lungs: normal resp effort  Breasts: bilaterally soft, non-tender, nipples intact  Abdomen: soft, non-tender, uterus firm at U - 2 fb  Perineum: approximated, no edema   Lochia: minimal rubra  Ext: bilaterally no pedal edema, without signs of DVT    Objective:     Vital Signs (Most Recent):  Temp: 98.5 °F (36.9 °C) (08/10/23 0823)  Pulse: 63 (08/10/23 0823)  Resp: 18 (08/10/23 0823)  BP: (!) 107/58 (08/10/23 0823)  SpO2: 97 % (08/10/23 0823) Vital Signs (24h Range):  Temp:  [98.1 °F (36.7 °C)-99.4 °F (37.4 °C)] 98.5 °F (36.9 °C)  Pulse:  [62-75] 63  Resp:  [17-18] 18  SpO2:  [97 %] 97 %  BP: ()/(52-58) 107/58        There is no height or weight on file to calculate BMI.    No intake or output data in the 24 hours ending 08/10/23 0934      Significant Labs:  Lab Results   Component Value Date    GROUPTRH O POS 2023    HEPBSAG Negative 2023    STREPBCULT (A) 2023     STREPTOCOCCUS AGALACTIAE (GROUP B)  In case of Penicillin allergy, call lab for further testing.  Beta-hemolytic streptococci are routinely susceptible to   penicillins,cephalosporins and carbapenems.       Recent Labs   Lab 23  0615   HGB 9.3*   HCT 27.7*       I have personallly reviewed all pertinent lab results from the last 24 hours.  Recent Lab Results       None

## 2023-08-22 ENCOUNTER — PATIENT MESSAGE (OUTPATIENT)
Dept: PSYCHIATRY | Facility: CLINIC | Age: 28
End: 2023-08-22
Payer: MEDICAID

## 2024-01-28 NOTE — SUBJECTIVE & OBJECTIVE
Interval History: PPD1    She is doing well this morning. She is tolerating a regular diet without nausea or vomiting. She is voiding spontaneously. She is ambulating. She has passed flatus, and has not a BM. Vaginal bleeding is mild. She denies fever or chills. Abdominal pain is mild and controlled with oral medications. She Is breastfeeding. She desires circumcision for her male baby: not applicable.    Objective:     Vital Signs (Most Recent):  Temp: 98.2 °F (36.8 °C) (08/09/23 0831)  Pulse: 61 (08/09/23 0831)  Resp: 18 (08/09/23 0831)  BP: (!) 92/55 (08/09/23 0831)  SpO2: 97 % (08/09/23 0831) Vital Signs (24h Range):  Temp:  [97.8 °F (36.6 °C)-98.3 °F (36.8 °C)] 98.2 °F (36.8 °C)  Pulse:  [61-86] 61  Resp:  [18] 18  SpO2:  [95 %-97 %] 97 %  BP: ()/(55-63) 92/55        There is no height or weight on file to calculate BMI.      Intake/Output Summary (Last 24 hours) at 8/9/2023 1131  Last data filed at 8/8/2023 1140  Gross per 24 hour   Intake --   Output 400 ml   Net -400 ml         Significant Labs:  Lab Results   Component Value Date    GROUPTRH O POS 08/08/2023    HEPBSAG Negative 01/09/2023    STREPBCULT (A) 07/27/2023     STREPTOCOCCUS AGALACTIAE (GROUP B)  In case of Penicillin allergy, call lab for further testing.  Beta-hemolytic streptococci are routinely susceptible to   penicillins,cephalosporins and carbapenems.       Recent Labs   Lab 08/09/23  0615   HGB 9.3*   HCT 27.7*       I have personallly reviewed all pertinent lab results from the last 24 hours.    Physical Exam:   Constitutional: She is oriented to person, place, and time. She appears well-developed and well-nourished.    HENT:   Head: Normocephalic and atraumatic.    Eyes: Conjunctivae are normal.     Cardiovascular:  Normal rate, regular rhythm and normal heart sounds.             Pulmonary/Chest: Effort normal and breath sounds normal.        Abdominal: Soft. Bowel sounds are normal. There is no abdominal tenderness.      Genitourinary: There is vaginal discharge (lochia) in the vagina.           Musculoskeletal: Normal range of motion.       Neurological: She is alert and oriented to person, place, and time.    Skin: Skin is warm and dry.    Psychiatric: She has a normal mood and affect. Her behavior is normal. Judgment and thought content normal.       Review of Systems   Constitutional:  Positive for fatigue. Negative for chills and fever.   Eyes: Negative.    Respiratory: Negative.     Cardiovascular: Negative.    Gastrointestinal:  Positive for abdominal pain. Negative for nausea and vomiting.   Endocrine: Negative.    Genitourinary:  Positive for vaginal bleeding. Negative for vaginal odor.   Musculoskeletal: Negative.    Integumentary:  Negative.   Neurological: Negative.    Hematological: Negative.    Psychiatric/Behavioral: Negative.     Breast: negative.       Never